# Patient Record
Sex: MALE | Race: WHITE | NOT HISPANIC OR LATINO | Employment: UNEMPLOYED | URBAN - METROPOLITAN AREA
[De-identification: names, ages, dates, MRNs, and addresses within clinical notes are randomized per-mention and may not be internally consistent; named-entity substitution may affect disease eponyms.]

---

## 2018-11-03 ENCOUNTER — HOSPITAL ENCOUNTER (EMERGENCY)
Facility: CLINIC | Age: 38
Discharge: HOME | End: 2018-11-03
Attending: EMERGENCY MEDICINE | Admitting: EMERGENCY MEDICINE

## 2018-11-03 VITALS
OXYGEN SATURATION: 99 % | RESPIRATION RATE: 18 BRPM | DIASTOLIC BLOOD PRESSURE: 76 MMHG | SYSTOLIC BLOOD PRESSURE: 108 MMHG | TEMPERATURE: 99.1 F

## 2018-11-03 DIAGNOSIS — F11.93 OPIOID WITHDRAWAL (H): ICD-10-CM

## 2018-11-03 PROCEDURE — 96360 HYDRATION IV INFUSION INIT: CPT

## 2018-11-03 PROCEDURE — 25000132 ZZH RX MED GY IP 250 OP 250 PS 637: Performed by: EMERGENCY MEDICINE

## 2018-11-03 PROCEDURE — 25000128 H RX IP 250 OP 636: Performed by: EMERGENCY MEDICINE

## 2018-11-03 PROCEDURE — 99283 EMERGENCY DEPT VISIT LOW MDM: CPT | Mod: 25

## 2018-11-03 PROCEDURE — 96361 HYDRATE IV INFUSION ADD-ON: CPT

## 2018-11-03 RX ORDER — CLONIDINE HYDROCHLORIDE 0.1 MG/1
0.2 TABLET ORAL ONCE
Status: COMPLETED | OUTPATIENT
Start: 2018-11-03 | End: 2018-11-03

## 2018-11-03 RX ORDER — PROMETHAZINE HYDROCHLORIDE 25 MG/ML
12.5 INJECTION, SOLUTION INTRAMUSCULAR; INTRAVENOUS ONCE
Status: DISCONTINUED | OUTPATIENT
Start: 2018-11-03 | End: 2018-11-03

## 2018-11-03 RX ORDER — DIPHENHYDRAMINE HYDROCHLORIDE 50 MG/ML
25 INJECTION INTRAMUSCULAR; INTRAVENOUS ONCE
Status: DISCONTINUED | OUTPATIENT
Start: 2018-11-03 | End: 2018-11-03 | Stop reason: HOSPADM

## 2018-11-03 RX ADMIN — CLONIDINE HYDROCHLORIDE 0.2 MG: 0.1 TABLET ORAL at 12:24

## 2018-11-03 RX ADMIN — SODIUM CHLORIDE 1000 ML: 9 INJECTION, SOLUTION INTRAVENOUS at 12:27

## 2018-11-03 ASSESSMENT — ENCOUNTER SYMPTOMS
ABDOMINAL PAIN: 1
DIARRHEA: 1
VOMITING: 1

## 2018-11-03 NOTE — ED TRIAGE NOTES
Pt arrives to ed with complaints of shaking, n/v, diarrhea for the last 3 days. Pt is withdrawing from heroin, LD 3.5 days ago. Pt states he is here with his father in the lobby in which he does not want him to know what is going on. He recently moved back to [place] to get clean. Pt has been using since he was 19 years old on and off.

## 2018-11-03 NOTE — ED AVS SNAPSHOT
[facility] Emergency Department    [address]    [facility] [address]    Phone:  [phone]    Fax:  [phone]                                       Ziggy Ochoa   MRN: [id number]    Department:  [facility] Emergency Department   Date of Visit:  11/3/2018           Patient Information     Date Of Birth          1980        Your diagnoses for this visit were:     Opioid withdrawal (H)        You were seen by Prasad Bunch MD and Ramon Perez MD.      Follow-up Information     Follow up with [facility] Emergency Department.    Specialty:  EMERGENCY MEDICINE    Why:  If symptoms worsen    Contact information:    [address]  [facility] [address] [phone]        Discharge Instructions         Opioid Withdrawal  Opioid withdrawal occurs in people who have used opioids on a daily basis for at least 3 weeks. Symptoms usually start about 12 hours after the last dose of the opioid. Withdrawal symptoms last from 3 to 5 days and may include yawning, sweating, runny nose, restlessness, stomach cramping, diarrhea, nausea, vomiting, hot and cold flashes, and trouble sleeping.  Home care  The treatment for withdrawal is mostly managing the symptoms without making the problem worse. Follow these guidelines when caring for yourself at home:    Stay with someone who can help you and give you emotional support during this time. Resist the temptation to take more of the addicting drug to stop your symptoms.    If you have stomach cramps, nausea, or vomiting, take only clear liquids until the symptoms improve. Adults should drink a total of 2 to 3 quarts of liquid daily. It is best to take small frequent drinks rather than a few large ones. You may consume liquids in any of the following forms: mineral water, apple juice, sports drinks, soft drinks without caffeine, clear broth soups, plain gelatin, and ice pops.    Don't use alcohol,  caffeine, or tobacco during this time.    Take any medicines prescribed for nausea or cramping exactly as directed.    If you were given a clonidine patch, leave this on for 7 days. You may remove it sooner if you develop excess dizziness or drowsiness.  Follow-up care  Follow up with your healthcare provider if you need further symptom control, or as advised. When you are through withdrawal, take the opportunity to enter a treatment program. This may help you stay off the addicting drug.  When to seek medical advice  Call your healthcare provider right away if any of these occur:    Fever of 100.4 F (38 C) or higher, or as directed by your healthcare provider    Inability to keep down liquids for 8 hours    Frequent diarrhea    Signs of infection at the site of IV needle use (redness, warmth, pain, or swelling)  Call 911  Call 911 if any of these occur:    Trouble breathing or swallowing, or wheezing    Severe confusion    Extreme drowsiness or trouble awakening    Fainting or loss of consciousness    Rapid heart rate or very slow heart rate    Very low or very high blood pressure    Vomiting blood, or large amounts of blood in stool    Seizure  Date Last Reviewed: 3/1/2017    1620-8074 [employer]. [address] [place] [address]. All rights reserved. This information is not intended as a substitute for professional medical care. Always follow your healthcare professional's instructions.          24 Hour Appointment Hotline       To make an appointment at any [facility], call [phone] ([phone]). If you don't have a family doctor or clinic, we will help you find one. [facility] are conveniently located to serve the needs of you and your family.             Review of your medicines      Our records show that you are taking the medicines listed below. If these are incorrect, please call your family doctor or clinic.        Dose / Directions Last dose taken    CLONAZEPAM  PO        Refills:  0                Procedures and tests performed during your visit     Basic metabolic panel (BMP)    CBC + differential    IV access      Orders Needing Specimen Collection     None      Pending Results     Date and Time Order Name Status Description    11/3/2018 1208 Basic metabolic panel (BMP) In process     11/3/2018 1208 CBC + differential In process             Pending Culture Results     No orders found from 11/1/2018 to 11/4/2018.            Pending Results Instructions     If you had any lab results that were not finalized at the time of your Discharge, you can call the ED Lab Result RN at [phone]. You will be contacted by this team for any positive Lab results or changes in treatment. The nurses are available 7 days a week from 10A to 6:30P.  You can leave a message 24 hours per day and they will return your call.        Test Results From Your Hospital Stay        11/3/2018 12:30 PM         11/3/2018 12:30 PM                Clinical Quality Measure: Blood Pressure Screening     Your blood pressure was checked while you were in the emergency department today. The last reading we obtained was  BP: 108/76 . Please read the guidelines below about what these numbers mean and what you should do about them.  If your systolic blood pressure (the top number) is less than 120 and your diastolic blood pressure (the bottom number) is less than 80, then your blood pressure is normal. There is nothing more that you need to do about it.  If your systolic blood pressure (the top number) is 120-139 or your diastolic blood pressure (the bottom number) is 80-89, your blood pressure may be higher than it should be. You should have your blood pressure rechecked within a year by a primary care provider.  If your systolic blood pressure (the top number) is 140 or greater or your diastolic blood pressure (the bottom number) is 90 or greater, you may have high blood pressure. High blood pressure is  "treatable, but if left untreated over time it can put you at risk for heart attack, stroke, or kidney failure. You should have your blood pressure rechecked by a primary care provider within the next 4 weeks.  If your provider in the emergency department today gave you specific instructions to follow-up with your doctor or provider even sooner than that, you should follow that instruction and not wait for up to 4 weeks for your follow-up visit.        Thank you for choosing [facility]       Thank you for choosing [facility] for your care. Our goal is always to provide you with excellent care. Hearing back from our patients is one way we can continue to improve our services. Please take a few minutes to complete the written survey that you may receive in the mail after you visit with us. Thank you!        [employer] Information     [employer] lets you send messages to your doctor, view your test results, renew your prescriptions, schedule appointments and more. To sign up, go to [email or web address] . Click on \"Log in\" on the left side of the screen, which will take you to the Welcome page. Then click on \"Sign up Now\" on the right side of the page.     You will be asked to enter the access code listed below, as well as some personal information. Please follow the directions to create your username and password.     Your access code is: [email or web address]  Expires: 2019  2:07 PM     Your access code will  in 90 days. If you need help or a new code, please call your [facility] clinic or [phone].        Care EveryWhere ID     This is your Care EveryWhere ID. This could be used by other organizations to access your [facility] medical records  [id number]        Equal Access to Services     [facility] BRIDGET : Bienvenido Ramirez, jacklyn garcia, naif reed. So [facility] [phone].    ATENCIÓN: Si habla español, tiene a ahn disposición servicios gratuitos de " asistencia lingüística. Alec al [phone].    We comply with applicable federal civil rights laws and [place] laws. We do not discriminate on the basis of race, color, national origin, age, disability, sex, sexual orientation, or gender identity.            After Visit Summary       This is your record. Keep this with you and show to your community pharmacist(s) and doctor(s) at your next visit.

## 2018-11-03 NOTE — ED NOTES
Patient came up to me at my computer clothed and stated he wanted to leave, everything we are doing for him hear he can do at home. I stated it would be best if he stayed so we can confirm nothing is wrong and help him get set up with proper treatment if possible, patient stated he did not want that and left.

## 2018-11-03 NOTE — DISCHARGE INSTRUCTIONS
Opioid Withdrawal  Opioid withdrawal occurs in people who have used opioids on a daily basis for at least 3 weeks. Symptoms usually start about 12 hours after the last dose of the opioid. Withdrawal symptoms last from 3 to 5 days and may include yawning, sweating, runny nose, restlessness, stomach cramping, diarrhea, nausea, vomiting, hot and cold flashes, and trouble sleeping.  Home care  The treatment for withdrawal is mostly managing the symptoms without making the problem worse. Follow these guidelines when caring for yourself at home:    Stay with someone who can help you and give you emotional support during this time. Resist the temptation to take more of the addicting drug to stop your symptoms.    If you have stomach cramps, nausea, or vomiting, take only clear liquids until the symptoms improve. Adults should drink a total of 2 to 3 quarts of liquid daily. It is best to take small frequent drinks rather than a few large ones. You may consume liquids in any of the following forms: mineral water, apple juice, sports drinks, soft drinks without caffeine, clear broth soups, plain gelatin, and ice pops.    Don't use alcohol, caffeine, or tobacco during this time.    Take any medicines prescribed for nausea or cramping exactly as directed.    If you were given a clonidine patch, leave this on for 7 days. You may remove it sooner if you develop excess dizziness or drowsiness.  Follow-up care  Follow up with your healthcare provider if you need further symptom control, or as advised. When you are through withdrawal, take the opportunity to enter a treatment program. This may help you stay off the addicting drug.  When to seek medical advice  Call your healthcare provider right away if any of these occur:    Fever of 100.4 F (38 C) or higher, or as directed by your healthcare provider    Inability to keep down liquids for 8 hours    Frequent diarrhea    Signs of infection at the site of IV needle use (redness,  warmth, pain, or swelling)  Call 911  Call 911 if any of these occur:    Trouble breathing or swallowing, or wheezing    Severe confusion    Extreme drowsiness or trouble awakening    Fainting or loss of consciousness    Rapid heart rate or very slow heart rate    Very low or very high blood pressure    Vomiting blood, or large amounts of blood in stool    Seizure  Date Last Reviewed: 3/1/2017    7919-5866 [employer]. [address] [place] [address], [place] [address]. All rights reserved. This information is not intended as a substitute for professional medical care. Always follow your healthcare professional's instructions.

## 2018-11-03 NOTE — ED PROVIDER NOTES
History     Chief Complaint:  Withdrawal    HPI   Ziggy Ochoa is a 38 year old male with a history of heroin abuse, who presents to the ED for the evaluation of withdrawal. The patient reports that one week ago he moved from [place], [place] to [place], [place] in order to get clean from heroin. The patient notes that the last time he used heroin was three days ago and that he is starting to experience diarrhea, cramping in his abdomen, vomiting, and shaking, prompting him to the ED. The patient notes that he made the decision to move and get clean from heroin by himself and that his family does not know he has been using. The patient denies use of alcohol or other drugs.    Allergies:  No known drug allergies     Medications:    The patient is not currently taking any prescribed medications.    Past Medical History:    Heroin Abuse    Past Surgical History:    History reviewed. No pertinent surgical history.    Family History:    History reviewed. No pertinent family history.     Social History:  Smoking status: Never Smoker  Alcohol use: No  Marital Status:  Single     Review of Systems   Gastrointestinal: Positive for abdominal pain, diarrhea and vomiting.   All other systems reviewed and are negative.    Physical Exam     Patient Vitals for the past 24 hrs:   BP Temp Temp src Heart Rate Resp SpO2   11/03/18 1224 108/76 - - - - -   11/03/18 1212 118/86 99.1  F (37.3  C) Oral 102 18 99 %       Physical Exam  Vital signs and nursing notes reviewed.     Constitutional: laying on gurney appear mildly uncomfortable  HENT: Oropharynx is clear and moist  Eyes: Conjunctivae are normal bilaterally. Pupils equal mildly dilated  Neck: normal range of motion  Cardiovascular: mild rate, regular rhythm, normal heart sounds.   Pulmonary/Chest: Effort normal and breath sounds normal. No respiratory distress.   Abdominal: Soft. Bowel sounds are normal. No tenderness to palpation. No rebound or guarding.   Musculoskeletal:  No joint swelling or edema.   Neurological: Alert and oriented. No focal weakness, moves all extremities equally.  Skin: Skin is warm and dry. No rash noted.   Psych: normal affect, no agitation, hallucinations    Emergency Department Course   Interventions:  1224, catapres, 0.2 mg, PO  1227, NS 1L IV Bolus    Emergency Department Course:  Past medical records, nursing notes, and vitals reviewed.  1204: I performed an exam of the patient and obtained history, as documented above.    IV inserted and blood drawn.    Patient Eloped    Impression & Plan    Medical Decision Making:  Patient is a 38 year old male who presents with symptoms suggesting of opioid withdrawal. Patient admits that he used to use heroin fairly significantly. He recently moved back to this area to get away from the heroin scene in [place]. He states that he last used about three days ago. Patient reports symptoms that would be consistent with opioid withdrawal. We placed an IV, and patient was given IV fluids, anti nausea medications and catapres, to hopefully help with his withdrawal symptoms. Patient is  alert and orientated. He has no evidence of delirium or other concerning findings. His vital signs show no significant abnormality. Patient then later asked the nurse if he was going to get some benzodiazapine's and he reported to the patient that not at this point and the patient said then he was just going to leave and did not want to talk to the physician. He said he can take the same medications that we gave him here at home. Patient had eloped before I was able to discuss with the patient his plan for further treatment.     Diagnosis:    ICD-10-CM    1. Opioid withdrawal (H) F11.23             Disposition:  Patient eloped.     Collins Duke  11/3/2018   [facility] EMERGENCY DEPARTMENT  Scribe Disclosure:  I, Collins Duke, am serving as a scribe at 12:04 PM on 11/3/2018 to document services personally performed by  Ramno Perez MD based on my observations and the provider's statements to me.      Ramon Perez MD  11/03/18 6992

## 2018-11-03 NOTE — ED AVS SNAPSHOT
[facility] Emergency Department    [address]    [facility] [address]    Phone:  [phone]    Fax:  [phone]                                       Ziggy Ochoa   MRN: [id number]    Department:  [facility] Emergency Department   Date of Visit:  11/3/2018           After Visit Summary Signature Page     I have received my discharge instructions, and my questions have been answered. I have discussed any challenges I see with this plan with the nurse or doctor.    ..........................................................................................................................................  Patient/Patient Representative Signature      ..........................................................................................................................................  Patient Representative Print Name and Relationship to Patient    ..................................................               ................................................  Date                                   Time    ..........................................................................................................................................  Reviewed by Signature/Title    ...................................................              ..............................................  Date                                               Time          22EPIC Rev 08/18

## 2021-06-03 ENCOUNTER — TELEPHONE (OUTPATIENT)
Dept: FAMILY MEDICINE | Facility: CLINIC | Age: 41
End: 2021-06-03

## 2021-06-03 ENCOUNTER — OFFICE VISIT (OUTPATIENT)
Dept: FAMILY MEDICINE | Facility: CLINIC | Age: 41
End: 2021-06-03
Payer: COMMERCIAL

## 2021-06-03 VITALS
HEART RATE: 86 BPM | HEIGHT: 75 IN | WEIGHT: 200 LBS | DIASTOLIC BLOOD PRESSURE: 84 MMHG | BODY MASS INDEX: 24.87 KG/M2 | OXYGEN SATURATION: 96 % | RESPIRATION RATE: 16 BRPM | TEMPERATURE: 98.3 F | SYSTOLIC BLOOD PRESSURE: 122 MMHG

## 2021-06-03 DIAGNOSIS — N63.0 LUMP OR MASS IN BREAST: Primary | ICD-10-CM

## 2021-06-03 DIAGNOSIS — L74.519 FOCAL HYPERHIDROSIS: Primary | ICD-10-CM

## 2021-06-03 DIAGNOSIS — R61 GENERALIZED HYPERHIDROSIS: ICD-10-CM

## 2021-06-03 PROCEDURE — 99204 OFFICE O/P NEW MOD 45 MIN: CPT | Performed by: NURSE PRACTITIONER

## 2021-06-03 RX ORDER — METHADONE HYDROCHLORIDE 10 MG/ML
90 CONCENTRATE ORAL EVERY 8 HOURS
COMMUNITY
End: 2023-02-17

## 2021-06-03 SDOH — HEALTH STABILITY: MENTAL HEALTH: HOW OFTEN DO YOU HAVE 6 OR MORE DRINKS ON ONE OCCASION?: NOT ASKED

## 2021-06-03 SDOH — HEALTH STABILITY: MENTAL HEALTH: HOW MANY STANDARD DRINKS CONTAINING ALCOHOL DO YOU HAVE ON A TYPICAL DAY?: NOT ASKED

## 2021-06-03 SDOH — HEALTH STABILITY: MENTAL HEALTH: HOW OFTEN DO YOU HAVE A DRINK CONTAINING ALCOHOL?: NOT ASKED

## 2021-06-03 ASSESSMENT — ANXIETY QUESTIONNAIRES
7. FEELING AFRAID AS IF SOMETHING AWFUL MIGHT HAPPEN: NOT AT ALL
6. BECOMING EASILY ANNOYED OR IRRITABLE: SEVERAL DAYS
2. NOT BEING ABLE TO STOP OR CONTROL WORRYING: MORE THAN HALF THE DAYS
1. FEELING NERVOUS, ANXIOUS, OR ON EDGE: MORE THAN HALF THE DAYS
5. BEING SO RESTLESS THAT IT IS HARD TO SIT STILL: SEVERAL DAYS
GAD7 TOTAL SCORE: 10
IF YOU CHECKED OFF ANY PROBLEMS ON THIS QUESTIONNAIRE, HOW DIFFICULT HAVE THESE PROBLEMS MADE IT FOR YOU TO DO YOUR WORK, TAKE CARE OF THINGS AT HOME, OR GET ALONG WITH OTHER PEOPLE: VERY DIFFICULT
3. WORRYING TOO MUCH ABOUT DIFFERENT THINGS: MORE THAN HALF THE DAYS

## 2021-06-03 ASSESSMENT — PATIENT HEALTH QUESTIONNAIRE - PHQ9
SUM OF ALL RESPONSES TO PHQ QUESTIONS 1-9: 5
5. POOR APPETITE OR OVEREATING: MORE THAN HALF THE DAYS

## 2021-06-03 ASSESSMENT — MIFFLIN-ST. JEOR: SCORE: 1897.82

## 2021-06-03 ASSESSMENT — PAIN SCALES - GENERAL: PAINLEVEL: NO PAIN (0)

## 2021-06-03 NOTE — PATIENT INSTRUCTIONS
Patient Education     Understanding Hyperhidrosis   Hyperhidrosis is excessive sweating. It s often an ongoing (chronic) condition. Sweating is a normal process. It helps manage body temperature and other processes of the body. But excessive sweating is more than is needed to do this. The symptoms can start when you re a child and continue into adulthood.    How to say it  [id number]-hi-DROH-[id number]  What causes hyperhidrosis?  In most cases, the cause isn t known. It may be because of a problem with how the nervous system responds to stress. In some cases, it may be caused by another health condition or a medicine. This is known as secondary hyperhidrosis.   Symptoms of hyperhidrosis  The main symptom of hyperhidrosis is heavy sweating that:    Can cause problems with daily activities and social events    Happens during the day but not at night    May happen with no physical activity  The sweating occurs most often in any or all of these areas:    Bottoms of the feet    Palms    Underarms  In some cases, it may also occur in these areas:    Face    Groin    Scalp    Under the breasts  Treatment for hyperhidrosis  Treatment choices include:    Antiperspirant. An antiperspirant that has 10% to 15% aluminum chloride can block sweat glands and help stop sweating. It comes in creams, sticks, gels, and sprays. You can buy antiperspirant at a drugstore. Or your healthcare provider may give you a stronger prescription antiperspirant that has 20% aluminum chloride. Antiperspirant should be applied to dry skin at night before bed. It needs to be applied every night for a week or two, and then once or twice a week or as needed. This can irritate the skin for some people.    Botulinum toxin. This is a type of medicine that is injected into the areas with sweat glands. This medicine temporarily blocks a chemical that stimulates the sweat glands. You ll need to get injections every 4 to 6 months.    Iontophoresis. This treatment uses  electricity to block sweat glands. Moist pads are put on the skin, or your hands or feet are placed in shallow water. Chemicals may be added to the water. An electrical current is sent through fluid. The process is done several times a week until sweating is reduced, and then once a week or as advised.    Surgery. In severe cases, you may have surgery to remove your sweat glands. Or surgery can be done to cut the nerves that send signals to the sweat glands. Either of these types of surgery can stop sweat permanently.  But this can lead to compensatory hyperhidrosis. This means you will start sweating from another part of your body.    Electromagnetic energy device. This device uses electromagnetic energy to destroy the sweat glands in the underarm area.    Treating another health condition, or changing a medicine.  A health condition or a medicine can cause secondary hyperhidrosis. This can be managed by treating the health condition, or by a change in medicine. Your healthcare provider will talk with you about these options if you have secondary hyperhidrosis.    Oral medicines. There are medicines that can be taken by mouth that will help reduce sweating.  Possible complications of hyperhidrosis  You may have skin problems in the areas where you sweat. The skin may become moist, pale, swollen, and soft enough to rub away easily. This is known as skin maceration. It can lead to loss of skin, pain, and skin infection. You can help prevent this problem by treating your hyperhidrosis and keeping your skin dry as much as possible.   Living with hyperhidrosis  Hyperhidrosis may be caused by or made worse by emotional stress and heat. It can also cause problems with work and social life. You may have stains on your clothes, and not want to shake hands with people. It can be upsetting to cope with the problems of excess sweat. Talk with your healthcare provider about the following:     Support groups    How to prevent  skin maceration    Other ways to manage your condition long-term  When to call your healthcare provider  Call your healthcare provider right away if you have any of these:    Symptoms that don t get better, or get worse    New symptoms  Juno last reviewed this educational content on 5/1/2020 2000-2021 [employer]. All rights reserved. This information is not intended as a substitute for professional medical care. Always follow your healthcare professional's instructions.

## 2021-06-03 NOTE — TELEPHONE ENCOUNTER
[facility] Prior Authorization Team Request    Medication: Drysol 20% SOLN  Dosing:   NDC (required for Medicaid members):     Insurance   BIN: [id number]  PCN: [place]  Grp: [id number]  ID: [id number]    CoverMyMeds Key (if applicable):     Additional documentation:     Filling Pharmacy: [facility]  Phone Number: [phone]  Contact: [facility] (P [id number]) please send all responses to this pool.  Pharmacy NPI (required for Medicaid members):

## 2021-06-03 NOTE — PROGRESS NOTES
"Assessment & Plan     Lump or mass in breast  - US Breast Left Complete 4 Quadrants  - Mammogram, diagnostic    Generalized hyperhidrosis  - aluminum chloride (DRYSOL) 20 % external solution  Dispense: 60 mL; Refill: 0  Topical: Apply once daily at bedtime; once excessive sweating has stopped, may decrease to once or twice weekly, or as needed. Wash treated area in the morning. Discussed with patient that prescription strength antiperspirants should be applied nightly to the area of hyperhidrosis until improvement is noted          27 minutes spent on the date of the encounter doing chart review, history and exam, documentation and further activities per the note  {       BMI:   Estimated body mass index is 25 kg/m  as calculated from the following:    Height as of this encounter: 1.905 m (6' 3\").    Weight as of this encounter: 90.7 kg (200 lb).       See Patient Instructions  Breast ultrasound and diagnostic mammogram.    Trial of aluminum chloride (DRYSOL) 20 % external solution    Return to clinic if no improvement or symptoms worsen.   Patient verbalized understanding & agreed with plan of care.    CHOLO Maynard, CNP  M [facility] [place]      Marysol Trinh is a 40 year old who presents for the following health issues  accompanied by himself:    HPI   Patient noticed a lump left areola x 2 to 3 weeks ago.  Not painful but maybe a little sore.  No redness.  States the lump has stayed the same.   Declines fevers, chills, or body aches.  No night sweats.   Has daily daily sweats though secondary to being on methadone.  Past heroine use but has been sober for 2 years.  Is being treated at the [facility] in [place] x 2 years.  Patient states he has been on methadone for 2 years but has trying to get off methadone.  States he is working with [facility] in [place] who is assisting him with tapering off the methadone. Is not on any other " "medications.  Was being seen by a psychiatrist. Tried gabapentin, zoloft - neither of the medication worked.   Diagnosed with anxiety and PTSD.    Feels like anxiety is doing okay.  Got a dog about 2 months ago which has made a huge different in his mood - mini andreea dumont.  Has been participating in exercise via walking with the dog.  History of heroin use - sober for 2 years.   Has lived all over the place - worked in tourist attractions and worked as a .  Currently on unemployment, looking for a job.  Marijuana use daily for anxiety.  No family history of breast cancer.    He does have excessive sweating due to methadone.  Declines concerns with excessive sweating when he isn't on methadone.   Would like treated for excessive sweating.  States he has had blood work in the past for HIV, hepatitis B and hepatitis C - no concerns with these today.   He states he is not currently sexually active.            Review of Systems   Constitutional, HEENT, cardiovascular, pulmonary, gi and gu systems are negative, except as otherwise noted.      Objective    /84 (BP Location: Right arm, Patient Position: Sitting, Cuff Size: Adult Regular)   Pulse 86   Temp 98.3  F (36.8  C) (Oral)   Resp 16   Ht 1.905 m (6' 3\")   Wt 90.7 kg (200 lb)   SpO2 96%   BMI 25.00 kg/m    Body mass index is 25 kg/m .  Physical Exam   GENERAL: healthy, alert and no distress  RESP: lungs clear to auscultation - no rales, rhonchi or wheezes  BREAST: right breast - normal without masses, tenderness or nipple discharge and no palpable axillary masses or adenopathy.  Left breast - small non-mobile lump noted under left nipple, no tenderness noted or nipple discharge.  No palpable axillary masses or adenopathy on the left side.  CV: regular rate and rhythm, normal S1 S2, no S3 or S4, no murmur, click or rub, no peripheral edema and peripheral pulses strong  SKIN: no suspicious lesions or rashes  NEURO: Normal strength and tone, " mentation intact and speech normal  PSYCH: mentation appears normal, affect normal/bright    Left breast ultrasound and diagnostic mammogram ordered.

## 2021-06-03 NOTE — NURSING NOTE
Chief Complaint   Patient presents with     Mass     Patient complains of a lump on the left side of his chest.          Pipo Mcwilliams MA on 6/3/2021 at 12:41 PM

## 2021-06-04 ASSESSMENT — ANXIETY QUESTIONNAIRES: GAD7 TOTAL SCORE: 10

## 2021-06-04 NOTE — TELEPHONE ENCOUNTER
PRIOR AUTHORIZATION DENIED    Medication: aluminum chloride (DRYSOL) 20 % external solution--DENIED    Denial Date: 6/4/2021    Denial Rational: per [facility] rep, there is no NDC rebate agreement with .  Patient would need to call [facility] to attempt a member level appeal.

## 2021-06-17 ENCOUNTER — ANCILLARY PROCEDURE (OUTPATIENT)
Dept: MAMMOGRAPHY | Facility: CLINIC | Age: 41
End: 2021-06-17
Attending: NURSE PRACTITIONER
Payer: COMMERCIAL

## 2021-06-17 DIAGNOSIS — N63.0 LUMP OR MASS IN BREAST: ICD-10-CM

## 2021-06-17 PROCEDURE — 77066 DX MAMMO INCL CAD BI: CPT

## 2021-07-11 ENCOUNTER — TELEPHONE (OUTPATIENT)
Dept: FAMILY MEDICINE | Facility: CLINIC | Age: 41
End: 2021-07-11

## 2021-08-31 NOTE — TELEPHONE ENCOUNTER
Patient is calling the clinic stating that he received a message from the provider about an alternative to the Drysol medication (Oxybutynin). Patient thought provider would be sending this to the pharmacy but it was never received.     Can provider send this medication to the patient's preferred pharmacy? Otherwise, if there is an issue, please reach out to the patient.     Ananth   [address] [place], [place] [id number] [phone]

## 2021-09-07 RX ORDER — OXYBUTYNIN CHLORIDE 5 MG/1
5 TABLET, EXTENDED RELEASE ORAL DAILY
Qty: 30 TABLET | Refills: 0 | Status: SHIPPED | OUTPATIENT
Start: 2021-09-07 | End: 2021-10-06

## 2021-09-07 NOTE — TELEPHONE ENCOUNTER
Primary focal hyperhidrosis (alternative agent) (off-label use): Oral:  Extended release: 5 to 10 mg once daily ([place] 2020)    Prescription sent to pharmacy.      Antoinette

## 2021-10-05 DIAGNOSIS — L74.519 FOCAL HYPERHIDROSIS: ICD-10-CM

## 2021-10-06 NOTE — TELEPHONE ENCOUNTER
oxybutynin ER (DITROPAN-XL) 5 MG 24 hr tablet  Take 1 tablet (5 mg) by mouth daily       Last Written Prescription Date:  9/7/21  Last Fill Quantity: 30,   # refills: 0  Last Office Visit : 6/3/21  Future Office visit:  none    Routing refill request to provider for review/approval because:  Med not on refill protocol for associated diagnosis.

## 2021-10-12 RX ORDER — OXYBUTYNIN CHLORIDE 5 MG/1
5 TABLET, EXTENDED RELEASE ORAL DAILY
Qty: 30 TABLET | Refills: 0 | Status: SHIPPED | OUTPATIENT
Start: 2021-10-12 | End: 2021-11-17

## 2021-11-17 DIAGNOSIS — L74.519 FOCAL HYPERHIDROSIS: ICD-10-CM

## 2021-11-17 NOTE — TELEPHONE ENCOUNTER
OXYBUTYNIN ER 5MG TABLETS      Last Written Prescription Date:  10/12/21  Last Fill Quantity: 30,   # refills: 0  Last Office Visit : 6/3/21  Future Office visit:  None scheduled    Routing refill request to provider for review/approval because:  Last prescribed for limited quantity without refills

## 2021-11-23 ENCOUNTER — TELEPHONE (OUTPATIENT)
Dept: FAMILY MEDICINE | Facility: CLINIC | Age: 41
End: 2021-11-23
Payer: COMMERCIAL

## 2021-11-23 RX ORDER — OXYBUTYNIN CHLORIDE 5 MG/1
5 TABLET, EXTENDED RELEASE ORAL DAILY
Qty: 30 TABLET | Refills: 0 | Status: SHIPPED | OUTPATIENT
Start: 2021-11-23 | End: 2022-07-26

## 2021-11-23 NOTE — TELEPHONE ENCOUNTER
Called patient to let him know that medication is at the pharmacy and to keep the appt for 12/7/21.    Ashley Singer CMA

## 2021-11-23 NOTE — TELEPHONE ENCOUNTER
I would like to see patient back in clinic for a medication check.  Please call patient to help schedule this.      Thank you,  Antoinette

## 2021-11-23 NOTE — TELEPHONE ENCOUNTER
Patient scheduled for next available day, 12/7/21. He is wondering if he can get his medication refilled before that date because he will be out.     Ashley Singer, CMA

## 2021-12-24 DIAGNOSIS — L74.519 FOCAL HYPERHIDROSIS: ICD-10-CM

## 2021-12-28 RX ORDER — OXYBUTYNIN CHLORIDE 5 MG/1
5 TABLET, EXTENDED RELEASE ORAL DAILY
Qty: 30 TABLET | Refills: 0 | OUTPATIENT
Start: 2021-12-28

## 2021-12-28 NOTE — TELEPHONE ENCOUNTER
OXYBUTYNIN ER 5MG TABLETS      Last Written Prescription Date:  11/23/21  Last Fill Quantity: 30,   # refills: 0  Last Office Visit : 6/3/21  Future Office visit:  None scheduled     Routing refill request to provider for review/approval because:  No showed 12/7/21  Last filled for #30- no refills..

## 2022-01-04 ENCOUNTER — OFFICE VISIT (OUTPATIENT)
Dept: FAMILY MEDICINE | Facility: CLINIC | Age: 42
End: 2022-01-04
Payer: COMMERCIAL

## 2022-01-04 VITALS
SYSTOLIC BLOOD PRESSURE: 118 MMHG | HEART RATE: 100 BPM | DIASTOLIC BLOOD PRESSURE: 78 MMHG | HEIGHT: 75 IN | BODY MASS INDEX: 25.86 KG/M2 | OXYGEN SATURATION: 98 % | WEIGHT: 208 LBS

## 2022-01-04 DIAGNOSIS — R61 GENERALIZED HYPERHIDROSIS: ICD-10-CM

## 2022-01-04 DIAGNOSIS — L74.519 FOCAL HYPERHIDROSIS: Primary | ICD-10-CM

## 2022-01-04 PROCEDURE — 99213 OFFICE O/P EST LOW 20 MIN: CPT | Performed by: NURSE PRACTITIONER

## 2022-01-04 RX ORDER — OXYBUTYNIN CHLORIDE 10 MG/1
10 TABLET, EXTENDED RELEASE ORAL DAILY
Qty: 90 TABLET | Refills: 1 | Status: SHIPPED | OUTPATIENT
Start: 2022-01-04 | End: 2022-07-26

## 2022-01-04 RX ORDER — LORAZEPAM 0.5 MG/1
0.5 TABLET ORAL 2 TIMES DAILY PRN
COMMUNITY
Start: 2021-12-08 | End: 2024-04-18

## 2022-01-04 ASSESSMENT — MIFFLIN-ST. JEOR: SCORE: 1929.11

## 2022-01-04 ASSESSMENT — PAIN SCALES - GENERAL: PAINLEVEL: MILD PAIN (3)

## 2022-01-04 NOTE — PATIENT INSTRUCTIONS

## 2022-01-04 NOTE — NURSING NOTE
Chief Complaint   Patient presents with     Recheck Medication     med check, follow up per pt        Xiao Zuñiga EMT at 1:16 PM on 1/4/2022.

## 2022-01-04 NOTE — PROGRESS NOTES
"  Assessment & Plan   Generalized hyperhidrosis  - oxybutynin ER (DITROPAN-XL) 10 MG 24 hr tablet  Dispense: 90 tablet; Refill: 1    10 minutes spent on the date of the encounter doing chart review, history and exam, documentation and further activities per the note  56}     BMI:   Estimated body mass index is 26 kg/m  as calculated from the following:    Height as of this encounter: 1.905 m (6' 3\").    Weight as of this encounter: 94.3 kg (208 lb).       See Patient Instructions  Follow-up August 2022, sooner if needed  Return to clinic if no improvement or symptoms worsen.  Patient verbalized understanding & agreed with plan of care.    CHOLO Maynard, CNP  M [facility] [place] Trinh is a 41 year old who presents for the following health issues  accompanied by himself.    HPI   Patient last seen June 2021.  He was seen for a left breast mass and concerns with excessive seating. He is on methadone. Past history of heroine use but remains soober for over 2 years. He had bilateral diagnostic mammogram which showed mild left-sided gynecomastia.  No suspicious finding were identified.  Methadone can have a side effect of gynecomastia.  He is trying to get off methadone.   Does not want and further work-up unless gynecomastia persists after he is zeinab to stop the methadone.  He was started on oxybutynin 5mg which is feels is helpful for generalized hyperhidrosis.   Drysol lwas not covered by his insurance.  He declines any further questions ro concerns.    Review of Systems   Constitutional, HEENT, cardiovascular, pulmonary, gi and gu systems are negative, except as otherwise noted.      Objective    /78 (BP Location: Right arm, Patient Position: Sitting, Cuff Size: Adult Regular)   Pulse 100   Ht 1.905 m (6' 3\")   Wt 94.3 kg (208 lb)   SpO2 98%   BMI 26.00 kg/m    Body mass index is 26 kg/m .  Physical Exam   GENERAL: healthy, alert and no " distress  CV: regular rate and rhythm, normal S1 S2, no S3 or S4, no murmur, click or rub, no peripheral edema and peripheral pulses strong  ABDOMEN: soft, nontender, no hepatosplenomegaly, no masses and bowel sounds normal  MS: no gross musculoskeletal defects noted, no edema  PSYCH: mentation appears normal, affect normal/bright    No results found for any previous visit.

## 2022-07-22 DIAGNOSIS — R61 GENERALIZED HYPERHIDROSIS: ICD-10-CM

## 2022-07-25 NOTE — TELEPHONE ENCOUNTER
oxybutynin ER (DITROPAN-XL) 10 MG 24 hr tablet      Last Written Prescription Date:  1/4/2022  Last Fill Quantity: 90,   # refills: 1  Last Office Visit : 1/4/2022  Future Office visit:  7/26/2022    Routing refill request to provider for review/approval because:  Refill needs review- continue at current dose?  - last visit 1/4/2022 dose increased to 10mg from 5mg   - future visit tomorrow 7/26/2022

## 2022-07-26 ENCOUNTER — OFFICE VISIT (OUTPATIENT)
Dept: FAMILY MEDICINE | Facility: CLINIC | Age: 42
End: 2022-07-26
Payer: COMMERCIAL

## 2022-07-26 VITALS
OXYGEN SATURATION: 99 % | RESPIRATION RATE: 17 BRPM | TEMPERATURE: 98.9 F | WEIGHT: 199.2 LBS | HEART RATE: 75 BPM | SYSTOLIC BLOOD PRESSURE: 124 MMHG | DIASTOLIC BLOOD PRESSURE: 86 MMHG | BODY MASS INDEX: 24.9 KG/M2

## 2022-07-26 DIAGNOSIS — G44.209 ACUTE NON INTRACTABLE TENSION-TYPE HEADACHE: Primary | ICD-10-CM

## 2022-07-26 DIAGNOSIS — K21.00 GASTROESOPHAGEAL REFLUX DISEASE WITH ESOPHAGITIS, UNSPECIFIED WHETHER HEMORRHAGE: ICD-10-CM

## 2022-07-26 DIAGNOSIS — R61 GENERALIZED HYPERHIDROSIS: ICD-10-CM

## 2022-07-26 PROCEDURE — 99213 OFFICE O/P EST LOW 20 MIN: CPT | Performed by: NURSE PRACTITIONER

## 2022-07-26 RX ORDER — OMEPRAZOLE 40 MG/1
40 CAPSULE, DELAYED RELEASE ORAL DAILY
Qty: 30 CAPSULE | Refills: 0 | Status: SHIPPED | OUTPATIENT
Start: 2022-07-26 | End: 2022-08-30

## 2022-07-26 RX ORDER — OXYBUTYNIN CHLORIDE 10 MG/1
10 TABLET, EXTENDED RELEASE ORAL DAILY
Qty: 90 TABLET | Refills: 1 | Status: SHIPPED | OUTPATIENT
Start: 2022-07-26 | End: 2023-02-17

## 2022-07-26 RX ORDER — METOCLOPRAMIDE 10 MG/1
10 TABLET ORAL
Qty: 10 TABLET | Refills: 0 | Status: SHIPPED | OUTPATIENT
Start: 2022-07-26 | End: 2024-04-18

## 2022-07-26 RX ORDER — OXYBUTYNIN CHLORIDE 10 MG/1
10 TABLET, EXTENDED RELEASE ORAL DAILY
Qty: 90 TABLET | Refills: 3 | OUTPATIENT
Start: 2022-07-26

## 2022-07-26 RX ORDER — SUMATRIPTAN 50 MG/1
50 TABLET, FILM COATED ORAL
Qty: 10 TABLET | Refills: 0 | Status: SHIPPED | OUTPATIENT
Start: 2022-07-26 | End: 2023-02-17

## 2022-07-26 ASSESSMENT — PATIENT HEALTH QUESTIONNAIRE - PHQ9
5. POOR APPETITE OR OVEREATING: MORE THAN HALF THE DAYS
SUM OF ALL RESPONSES TO PHQ QUESTIONS 1-9: 4

## 2022-07-26 ASSESSMENT — ANXIETY QUESTIONNAIRES
IF YOU CHECKED OFF ANY PROBLEMS ON THIS QUESTIONNAIRE, HOW DIFFICULT HAVE THESE PROBLEMS MADE IT FOR YOU TO DO YOUR WORK, TAKE CARE OF THINGS AT HOME, OR GET ALONG WITH OTHER PEOPLE: SOMEWHAT DIFFICULT
3. WORRYING TOO MUCH ABOUT DIFFERENT THINGS: NEARLY EVERY DAY
5. BEING SO RESTLESS THAT IT IS HARD TO SIT STILL: SEVERAL DAYS
1. FEELING NERVOUS, ANXIOUS, OR ON EDGE: NEARLY EVERY DAY
GAD7 TOTAL SCORE: 13
GAD7 TOTAL SCORE: 13
2. NOT BEING ABLE TO STOP OR CONTROL WORRYING: NEARLY EVERY DAY
7. FEELING AFRAID AS IF SOMETHING AWFUL MIGHT HAPPEN: SEVERAL DAYS
6. BECOMING EASILY ANNOYED OR IRRITABLE: NOT AT ALL

## 2022-07-26 NOTE — NURSING NOTE
Chief Complaint   Patient presents with     Abdominal Pain     Have been having it for six months to a year  cramping     Headache     Been getting worse, can no longer take anything because of his stomach, has had migraines

## 2022-07-26 NOTE — PROGRESS NOTES
"  Assessment & Plan     Generalized hyperhidrosis  oxybutynin ER (DITROPAN XL) 10 MG 24 hr tablet  Dispense: 90 tablet; Refill: 1    Acute non intractable tension-type headache  - SUMAtriptan (IMITREX) 50 MG tablet  Dispense: 10 tablet; Refill: 0  - metoclopramide (REGLAN) 10 MG tablet  Dispense: 10 tablet; Refill: 0    Gastroesophageal reflux disease with esophagitis, unspecified whether hemorrhage  - omeprazole (PRILOSEC) 40 MG DR capsule  Dispense: 30 capsule; Refill: 0      27 minutes spent on the date of the encounter doing chart review, history and exam, documentation and further activities per the note       BMI:   Estimated body mass index is 24.9 kg/m  as calculated from the following:    Height as of 1/4/22: 1.905 m (6' 3\").    Weight as of this encounter: 90.4 kg (199 lb 3.2 oz).       See Patient Instructions  Follow-up in one month, sooner if needed  Return to clinic if no improvement or symptoms worsen.  Patient verbalized understanding & agreed with plan of care.    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  States management is micromanaging.  He states it is very stressful.  [facility] work is always very busy.  Has a stomachache before work and during.  Has taking nexium x 6 months - nexium OTC.  If he doesn't take it, then he has pain.  Taking nexium 20mg.     GERD/Heartburn  Onset/Duration: x 7 to 8 months  Description:  Epigastric, feels like a \"pop cramp\"  Feels like you to need to burp and sometimes burping does relieve the pain.  Sleeping does help it.    Intensity: moderate to severe  Progression of Symptoms: " worsening  Accompanying Signs & Symptoms:  Does it feel like food gets stuck or trouble swallowing: No  Nausea: None with abdominal pain but has headaches.  Has nausea with headaches.   Vomiting (bloody?): No  Abdominal Pain: YES  Black-Tarry stools: No  Bloody stools: YES but due to hemorrhoid    Has pain with BM     History:  Previous similar episodes: No  Previous ulcers: No but has taken tums in the past for heartburn    Precipitating factors:   Caffeine use: - ice tea - a lot of it    Alcohol use: No  NSAID/Aspirin use: YES - aspirin with a migraine.  Migraines - sleeping neck wrong. Five times a month - two causing throwing up   Tobacco use: No  Worse with fatty foods and spicy foods. Stays away from fried foods.    Alleviating factors:  - nexium does make it better   Therapies tried and outcome:             Lifestyle changes: - weight loss, diet changes             Medications: Nexium    Has constipation due to methadone    Migraines - 5 times a month.    Usually two he feels sick to his stomach and sometimes will vomit. Headache back of head, middle.  Throbbing headache.  No vision changes.  Headaches been present for a few years.  Unsure when the headaches will be.  Is afraid of having headaches.   No arm or leg weakness.   Can feel a tightness in the back of his neck and feels knots back there.    Tylenol does help when it doesn't make his sick.   Must take Tylenol on a full stomach.    STS - methadone.  Trying to get off methadone.   Using medical cannabis.   Using ativan as needed.        Review of Systems   Constitutional, HEENT, cardiovascular, pulmonary, gi and gu systems are negative, except as otherwise noted.      Objective    /86 (BP Location: Right arm, Patient Position: Sitting, Cuff Size: Adult Regular)   Pulse 75   Temp 98.9  F (37.2  C) (Oral)   Resp 17   Wt 90.4 kg (199 lb 3.2 oz)   SpO2 99%   BMI 24.90 kg/m    Body mass index is 24.9 kg/m .  Physical Exam   GENERAL: healthy,  alert and no distress  HENT: oropharynx clear and oral mucous membranes moist  RESP: lungs clear to auscultation - no rales, rhonchi or wheezes  CV: regular rate and rhythm, normal S1 S2, no S3 or S4, no murmur, click or rub, no peripheral edema and peripheral pulses strong  ABDOMEN: soft, nontender, no hepatosplenomegaly, no masses and bowel sounds normal.  No rebound tenderness.  Mild discomfort to palpation of epigastric region     MS: no gross musculoskeletal defects noted, no edema    No results found for any previous visit.       .  ..

## 2022-08-23 DIAGNOSIS — K21.00 GASTROESOPHAGEAL REFLUX DISEASE WITH ESOPHAGITIS, UNSPECIFIED WHETHER HEMORRHAGE: ICD-10-CM

## 2022-08-25 NOTE — TELEPHONE ENCOUNTER
OMEPRAZOLE 40MG CAPSULES      Last Written Prescription Date:  7/26/22  Last Fill Quantity: 30,   # refills: 0  Last Office Visit : 7/26/22  Recommended 1 month follow up  Future Office visit:  None scheduled    Routing refill request to provider for review/approval because:  Limited quantity, no refills last provided

## 2022-08-30 ENCOUNTER — VIRTUAL VISIT (OUTPATIENT)
Dept: FAMILY MEDICINE | Facility: CLINIC | Age: 42
End: 2022-08-30
Payer: COMMERCIAL

## 2022-08-30 DIAGNOSIS — K21.00 GASTROESOPHAGEAL REFLUX DISEASE WITH ESOPHAGITIS, UNSPECIFIED WHETHER HEMORRHAGE: ICD-10-CM

## 2022-08-30 DIAGNOSIS — G44.209 ACUTE NON INTRACTABLE TENSION-TYPE HEADACHE: Primary | ICD-10-CM

## 2022-08-30 PROCEDURE — 99212 OFFICE O/P EST SF 10 MIN: CPT | Mod: GT | Performed by: NURSE PRACTITIONER

## 2022-08-30 RX ORDER — OMEPRAZOLE 40 MG/1
40 CAPSULE, DELAYED RELEASE ORAL DAILY
Qty: 30 CAPSULE | Refills: 0 | Status: SHIPPED | OUTPATIENT
Start: 2022-08-30 | End: 2022-09-27

## 2022-08-30 RX ORDER — OMEPRAZOLE 40 MG/1
40 CAPSULE, DELAYED RELEASE ORAL DAILY
Qty: 30 CAPSULE | Refills: 0 | OUTPATIENT
Start: 2022-08-30

## 2022-08-30 NOTE — PROGRESS NOTES
"Ziggy is a 41 year old who is being evaluated via a billable video visit.      How would you like to obtain your AVS? MyChart  Will anyone else be joining your video visit? No      Assessment & Plan     Gastroesophageal reflux disease with esophagitis, unspecified whether hemorrhage  - omeprazole (PRILOSEC) 40 MG DR capsule  Dispense: 30 capsule; Refill: 0    Acute non intractable tension-type headache    10 minutes spent on the date of the encounter doing chart review, history and exam, documentation and further activities per the note       See Patient Instructions  Continue with current plan of care. Follow-up in one month, sooner if needed  Return to clinic if no improvement or symptoms worsen.  Patient verbalized understanding & agreed with plan of care.    CHOLO Maynard, CNP  [facility] [place]    Marysol Trinh is a 41 year old accompanied by himself, presenting for the following health issues:  Recheck Medication (Med check in)      HPI   Patient was seen on 7/26/2022 for GERD and headaches. He was started on omeprazole 40mg and imitrex and reglan too take as needed. Patient states he recently left his job and is feeling much better.  He states he has not had a migraine since he saw me.  He states at the onset of a couple headaches he tried the medications.  He states he took reglan and imitrex and it worked well.  No side effects from the medications. He states he is is stll having some stomach pain but that it is improved. States yesterday had some pain. States it was located right in the middle of the stomach/same ache, same \"pop cramp\"  States he ran out of the omeprazole yesterday and that why he believes he had the abdominal discomfort. Otherwise, the omeprazole has been working well.he states he needs a refill. 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  He declines any other questions or concerns.    Review of Systems   Constitutional, HEENT, cardiovascular, " pulmonary, gi and gu systems are negative, except as otherwise noted.      Objective             Physical Exam   GENERAL: Healthy, alert and no distress  EYES: Eyes grossly normal to inspection.  No discharge or erythema, or obvious scleral/conjunctival abnormalities.  RESP: No audible wheeze, cough, or visible cyanosis.  No visible retractions or increased work of breathing.    SKIN: Visible skin clear. No significant rash, abnormal pigmentation or lesions.  NEURO: Cranial nerves grossly intact.  Mentation and speech appropriate for age.  PSYCH: Mentation appears normal, affect normal/bright, judgement and insight intact, normal speech and appearance well-groomed.    No results found for any previous visit.         Video-Visit Details    Video Start Time: 3:10pm    Type of service:  Video Visit    Video End Time:3:06pm    Originating Location (pt. Location): Home    Distant Location (provider location):  [facility] [place]     Platform used for Video Visit: DoApp  ..

## 2022-09-27 ENCOUNTER — VIRTUAL VISIT (OUTPATIENT)
Dept: FAMILY MEDICINE | Facility: CLINIC | Age: 42
End: 2022-09-27
Payer: COMMERCIAL

## 2022-09-27 DIAGNOSIS — G44.229 CHRONIC TENSION-TYPE HEADACHE, NOT INTRACTABLE: Primary | ICD-10-CM

## 2022-09-27 DIAGNOSIS — K21.00 GASTROESOPHAGEAL REFLUX DISEASE WITH ESOPHAGITIS, UNSPECIFIED WHETHER HEMORRHAGE: ICD-10-CM

## 2022-09-27 PROCEDURE — 99442 PR PHYSICIAN TELEPHONE EVALUATION 11-20 MIN: CPT | Mod: 95 | Performed by: NURSE PRACTITIONER

## 2022-09-27 RX ORDER — OMEPRAZOLE 40 MG/1
40 CAPSULE, DELAYED RELEASE ORAL DAILY
Qty: 90 CAPSULE | Refills: 0 | Status: SHIPPED | OUTPATIENT
Start: 2022-09-27 | End: 2024-04-18

## 2022-09-27 NOTE — PROGRESS NOTES
Ziggy is a 41 year old who is being evaluated via a billable telephone visit.      What phone number would you like to be contacted at? [phone]   How would you like to obtain your AVS? MyChart    Assessment & Plan     Gastroesophageal reflux disease with esophagitis, unspecified whether hemorrhage  - omeprazole (PRILOSEC) 40 MG DR capsule  Dispense: 90 capsule; Refill: 0  - Continue omeprazole.  Will taper off once he does have symptoms for 8 weeks.      Chronic tension-type headache, not intractable  - Cntinue with PRN imitrix and reglan.      14 minutes spent on the date of the encounter doing chart review, history and exam, documentation and further activities per the note       See Patient Instructions  Follow in 3 months, sooner if needed  Return to clinic if no improvement or symptoms worsen.  Patient verbalized understanding & agreed with plan of care.    CHOLO Maynard CNP [facility]   Ziggy is a 41 year old presenting for the following health issues:  RECHECK (Things have been going well )      HPI   Patient states he is doing well.  Declines any questions or concerns. Patient states he is having headaches - once a week - taking 1,000mg of tylenol and using imitrex as needed.  Hasn't need reglan.  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  GERD is under control with omeprazole, everyone once a while has heartburn symptoms.    Review of Systems   Constitutional, HEENT, cardiovascular, pulmonary, gi and gu systems are negative, except as otherwise noted.      Objective             Physical Exam   healthy, alert and no distress  PSYCH: Alert and oriented times 3; coherent speech, normal   rate and volume, able to articulate logical thoughts, able   to abstract reason, no tangential thoughts, no hallucinations   or delusions  His affect is normal  RESP: No cough, no audible wheezing, able to talk in full  sentences  Remainder of exam unable to be completed due to telephone visits    No diagnostics completed today      Phone call duration: 9 minutes

## 2022-09-28 DIAGNOSIS — K21.00 GASTROESOPHAGEAL REFLUX DISEASE WITH ESOPHAGITIS, UNSPECIFIED WHETHER HEMORRHAGE: ICD-10-CM

## 2022-10-03 RX ORDER — OMEPRAZOLE 40 MG/1
40 CAPSULE, DELAYED RELEASE ORAL DAILY
Qty: 90 CAPSULE | Refills: 1 | OUTPATIENT
Start: 2022-10-03

## 2022-10-03 NOTE — TELEPHONE ENCOUNTER
OMEPRAZOLE 40MG CAPSULES  omeprazole (PRILOSEC) 40 MG DR capsule 90 capsule 0 9/27/2022  No   Sig - Route: Take 1 capsule (40 mg) by mouth daily - Oral   Sent to pharmacy as: Omeprazole 40 MG Oral Capsule Delayed Release (priLOSEC)   Class: E-Prescribe   Order: [id number]   E-Prescribing Status: Receipt confirmed by pharmacy (9/27/2022  3:10 PM CDT)       Printout Tracking    External Result Report     Pharmacy    [id number] - [place], [place] - [address] AT [facility] [address]

## 2023-02-17 ENCOUNTER — VIRTUAL VISIT (OUTPATIENT)
Dept: FAMILY MEDICINE | Facility: CLINIC | Age: 43
End: 2023-02-17
Payer: COMMERCIAL

## 2023-02-17 DIAGNOSIS — K21.00 GASTROESOPHAGEAL REFLUX DISEASE WITH ESOPHAGITIS, UNSPECIFIED WHETHER HEMORRHAGE: Primary | ICD-10-CM

## 2023-02-17 DIAGNOSIS — R61 GENERALIZED HYPERHIDROSIS: ICD-10-CM

## 2023-02-17 DIAGNOSIS — G44.209 ACUTE NON INTRACTABLE TENSION-TYPE HEADACHE: ICD-10-CM

## 2023-02-17 RX ORDER — NICOTINE POLACRILEX 4 MG/1
20 GUM, CHEWING ORAL DAILY
Qty: 90 TABLET | Refills: 3 | Status: SHIPPED | OUTPATIENT
Start: 2023-02-17 | End: 2023-02-17

## 2023-02-17 RX ORDER — SUMATRIPTAN 50 MG/1
50 TABLET, FILM COATED ORAL
Qty: 10 TABLET | Refills: 0 | Status: SHIPPED | OUTPATIENT
Start: 2023-02-17 | End: 2024-04-18

## 2023-02-17 RX ORDER — NICOTINE POLACRILEX 4 MG/1
20 GUM, CHEWING ORAL DAILY
Qty: 90 TABLET | Refills: 1 | Status: SHIPPED | OUTPATIENT
Start: 2023-02-17 | End: 2024-03-06

## 2023-02-17 RX ORDER — OXYBUTYNIN CHLORIDE 10 MG/1
10 TABLET, EXTENDED RELEASE ORAL DAILY
Qty: 90 TABLET | Refills: 1 | Status: SHIPPED | OUTPATIENT
Start: 2023-02-17 | End: 2024-04-18

## 2023-02-17 NOTE — PROGRESS NOTES
"Ziggy is a 42 year old who is being evaluated via a billable telephone visit.      What phone number would you like to be contacted at? [phone]  How would you like to obtain your AVS? Mail a copy  Distant Location (provider location):  On-site        Subjective   Ziggy is a 42 year old, presenting for the following health issues: medication refills.        HPI     42-year-old male with PMH GERD, Tension-headaches, Hyperhidrosis and substance use disorder (IV heroin, sober 4 years) presents for medication refills. Patient previously saw Antoinette Weinstein CNP who has left our clinic.       GERD- Currently controlled with 40mg omeprazole. He reports \"severe abdominal pain\" if he goes 2 days without this medication. He has had GERD symptoms for about a year. He is open to trying a lower dose since his symptoms are controlled when he takes his medication regularly. He has not had an EGD.    Tension headaches- He reports increased stress over past 2 months and reports about 12 headaches/month that resolve with sumatriptan. He reports that he is working with a psychologist for his Anxiety/PTSD.  He denies hx bipolar. He has tried antidepressants in the past but discontinued them as he did not liked the way they made him feel. No other acute concerns/symptoms at time of exam.    Review of Systems   Constitutional, HEENT, cardiovascular, pulmonary, gi and gu systems are negative, except as otherwise noted.          Objective           Vitals:  No vitals were obtained today due to virtual visit.    Physical Exam   healthy, alert and no distress  PSYCH: Alert and oriented times 3; coherent speech, normal   rate and volume, able to articulate logical thoughts, able   to abstract reason, no tangential thoughts, no hallucinations   or delusions  His affect is normal  RESP: No cough, no audible wheezing, able to talk in full sentences  Remainder of exam unable to be completed due to telephone visits    Assessment/Plan    1. Acute " non intractable tension-type headache  Advised Effexor as a preventative, pt declined today as he would like to think about it.  - SUMAtriptan (IMITREX) 50 MG tablet; Take 1 tablet (50 mg) by mouth at onset of headache for migraine May repeat in 2 hours. Max 4 tablets/24 hours.  Dispense: 10 tablet; Refill: 0    2. Gastroesophageal reflux disease with esophagitis, unspecified whether hemorrhage  Pt overdue for BMP. Ordered BMP and CBC. Refer to [facility] to discuss EGD given ab pain when he's off his PPI (concerned he could be masking h.pylori with PPI.) Will try lower dose of omeprazole- if symptoms escalate, he can go back to 40mg.  - Basic metabolic panel; Future  - omeprazole 20 MG tablet; Take 1 tablet (20 mg) by mouth daily Take 30-60 minutes before a meal.  Dispense: 90 tablet; Refill: 1  - CBC with platelets; Future  - Adult GI  Referral - Consult Only; Future    3. Generalized hyperhidrosis  Refill per patient request.  - oxybutynin ER (DITROPAN XL) 10 MG 24 hr tablet; Take 1 tablet (10 mg) by mouth daily  Dispense: 90 tablet; Refill: 1    Follow-up in 3 months for physical, sooner if symptoms persist/worsen.    All questions/concerns addressed. Patient stated understanding/agreement to plan of care.    CHOLO Escoto, CNP  [facility] [employer]      Phone call duration: 10:42 minutes

## 2024-03-01 DIAGNOSIS — K21.00 GASTROESOPHAGEAL REFLUX DISEASE WITH ESOPHAGITIS, UNSPECIFIED WHETHER HEMORRHAGE: ICD-10-CM

## 2024-03-06 RX ORDER — NICOTINE POLACRILEX 4 MG/1
GUM, CHEWING ORAL
Qty: 90 TABLET | Refills: 0 | Status: SHIPPED | OUTPATIENT
Start: 2024-03-06 | End: 2024-06-15

## 2024-03-06 NOTE — TELEPHONE ENCOUNTER
2/17/2023  [facility]     Manolo Hopper APRN CNP Gastroesophageal reflux disease with esophagitis   Kathryn refill sent to the pharmacy - including instructions for patient to call the clinic and schedule an appointment.

## 2024-04-18 ENCOUNTER — OFFICE VISIT (OUTPATIENT)
Dept: FAMILY MEDICINE | Facility: CLINIC | Age: 44
End: 2024-04-18
Payer: COMMERCIAL

## 2024-04-18 VITALS
RESPIRATION RATE: 18 BRPM | BODY MASS INDEX: 23.33 KG/M2 | WEIGHT: 176 LBS | SYSTOLIC BLOOD PRESSURE: 124 MMHG | DIASTOLIC BLOOD PRESSURE: 81 MMHG | TEMPERATURE: 99.2 F | HEART RATE: 90 BPM | HEIGHT: 73 IN | OXYGEN SATURATION: 96 %

## 2024-04-18 DIAGNOSIS — K59.09 CHRONIC CONSTIPATION: ICD-10-CM

## 2024-04-18 DIAGNOSIS — R23.2 HOT FLASH IN MALE: ICD-10-CM

## 2024-04-18 DIAGNOSIS — R10.13 ABDOMINAL PAIN, CHRONIC, EPIGASTRIC: Primary | ICD-10-CM

## 2024-04-18 DIAGNOSIS — G89.29 ABDOMINAL PAIN, CHRONIC, EPIGASTRIC: Primary | ICD-10-CM

## 2024-04-18 RX ORDER — DIAZEPAM 5 MG
5 TABLET ORAL PRN
COMMUNITY
Start: 2024-04-15

## 2024-04-18 RX ORDER — ASPIRIN 81 MG
100 TABLET, DELAYED RELEASE (ENTERIC COATED) ORAL DAILY PRN
Qty: 30 TABLET | Refills: 1 | Status: SHIPPED | OUTPATIENT
Start: 2024-04-18 | End: 2024-08-29

## 2024-04-18 NOTE — PROGRESS NOTES
"Today's Date: Apr 18, 2024     Patient Ziggy Ochoa 1980 presents to the clinic today to address   Chief Complaint   Patient presents with    Gastrointestinal Problem     Has been having stomach issues for about a year, pain in stomach, \"feels like it is going to pop\", stress seems to trigger it, is thinking stomach ulcers, has history of abusing ibuprofen   has taken omeprazole and it seems to help   Referral for a GI specialist             SUBJECTIVE     History of Present Illness:      43-year-old male with past medical history of GERD, tension headaches, hyperhidrosis, and substance use order (currently sober-on methadone) presents to clinic to discuss abdominal pain and hot flashes.    Abdominal pain-patient reports chronic abdominal pain.  The pain will come daily.  When asked about the location of pain, he points to his epigastric region.  Associated symptoms include chronic constipation.  He typically has 1 bowel movement per day, however, he will need to strain.  He attributes constipation to his methadone treatment.  He has never had an EGD, he is interested in completing an EGD considering his symptoms.  Of note, patient reports that he \"abused ibuprofen for years.\"  He denies nausea, vomiting, or diarrhea.  He denies history of pancreatitis.  He denies alcohol use (he reports his last drink was 10 years ago).  He reports many times his pain will relieve after belching.  He is currently on omeprazole 20 mg/day which helps his GERD symptoms.    Hot flashes-patient reports 3 years of intermittent hot flashes \"ever since I started methadone.\"  He denies cough, coughing up blood, swollen lymph nodes, or unexpected weight loss.  He reports intermittent shortness of breath where he feels \"like I really need a deep breath.\"  He is a former smoker.  Denies history of asthma.  He endorses history of anxiety and has a mental health provider who prescribes Valium as needed (Psyfi TMS).  He goes to " specialized treatment services for his methadone.  He has never tried Suboxone.  He is curious about his testosterone levels as he reports chronic low libido.  He endorses daily am erections. No other acute concerns/symptoms at time of exam.      Review of Systems   Constitutional, HEENT, cardiovascular, pulmonary, gi and gu systems are negative, except as otherwise noted.      No Known Allergies     Current Outpatient Medications   Medication Sig Dispense Refill    diazepam (VALIUM) 5 MG tablet Take 5 mg by mouth as needed for anxiety      medical cannabis (Patient's own supply) 1 Dose See Admin Instructions (The purpose of this order is to document that the patient reports taking medical cannabis.  This is not a prescription, and is not used to certify that the patient has a qualifying medical condition.)      Methadone HCl (DOLOPHINE PO) Take 70 mg by mouth daily      omeprazole 20 MG tablet TAKE 1 TABLET(20 MG) BY MOUTH DAILY 30 TO 60 MINUTES BEFORE A MEAL 90 tablet 0     No current facility-administered medications for this visit.         No past medical history on file.     Family History   Problem Relation Age of Onset    Diabetes Type 2  Father     Cancer Maternal Grandfather     Myocardial Infarction Paternal Grandfather         Social History     Tobacco Use    Smoking status: Former    Smokeless tobacco: Never   Substance Use Topics    Alcohol use: Not Currently    Drug use: Yes     Types: Marijuana        History   Sexual Activity    Sexual activity: Not Currently    Partners: Female, Male            6/3/2021     1:05 PM 7/26/2022     1:45 PM   PHQ   PHQ-9 Total Score 5 4   Q9: Thoughts of better off dead/self-harm past 2 weeks Not at all Not at all        Immunization History   Administered Date(s) Administered    COVID-19 MONOVALENT 12+ (Pfizer) 04/28/2021, 05/19/2021, 02/15/2022                 OBJECTIVE     /81 (BP Location: Left arm, Patient Position: Sitting, Cuff Size: Adult Regular)    "Pulse 90   Temp 99.2  F (37.3  C) (Oral)   Resp 18   Ht 1.852 m (6' 0.9\")   Wt 79.8 kg (176 lb)   SpO2 96%   BMI 23.28 kg/m       Labs:  No results found for: \"WBC\", \"HGB\", \"HCT\", \"PLT\", \"CHOL\", \"TRIG\", \"HDL\", \"LDLDIRECT\", \"ALT\", \"AST\", \"NA\", \"CREATININE\", \"BUN\", \"CO2\", \"TSH\", \"PSA\", \"INR\", \"GLUF\", \"HGBA1C\", \"MICROALBUR\"     Physical Exam  Constitutional:       General: He is not in acute distress.     Appearance: He is not ill-appearing or diaphoretic.   Cardiovascular:      Rate and Rhythm: Normal rate and regular rhythm.      Heart sounds: Normal heart sounds. No murmur heard.  Pulmonary:      Effort: Pulmonary effort is normal. No respiratory distress.      Breath sounds: Normal breath sounds. No wheezing or rales.   Abdominal:      General: Bowel sounds are normal.      Palpations: Abdomen is soft.      Tenderness: There is no abdominal tenderness. There is no guarding.   Musculoskeletal:      Cervical back: Neck supple.      Right lower leg: No edema.      Left lower leg: No edema.   Lymphadenopathy:      Cervical: No cervical adenopathy.   Neurological:      General: No focal deficit present.      Mental Status: He is alert.   Psychiatric:         Thought Content: Thought content normal.         Judgment: Judgment normal.               ASSESSMENT/PLAN     1. Abdominal pain, chronic, epigastric    This is a pleasant 43-year-old male with past medical history of GERD, tension headaches, hyperhidrosis, and substance use order (currently sober-on methadone) who presents to discuss chronic epigastric abdominal pain.  Associated symptoms include chronic constipation.  Patient also reports that he is \"abused ibuprofen for years.\"  He denies history of pancreatitis or recent alcohol use.    His vitals are stable, patient is no acute distress.  Physical exam is unremarkable.  Considering history of chronic GERD, past ibuprofen abuse, and daily symptoms, will refer to for EGD and obtain labs noted below.  Will " start Colace to help with chronic constipation. Disposition pending results.      - Adult GI  Referral - Procedure Only; Future  - CBC with platelets differential  - Comprehensive metabolic panel    2. Chronic constipation  Start colace as noted above.  - docusate sodium (COLACE) 100 MG tablet; Take 1 tablet (100 mg) by mouth daily as needed for constipation  Dispense: 30 tablet; Refill: 1  - Adult GI  Referral - Procedure Only; Future    3. Hot flash in male  No diaphoresis noted today. In the absence of cough, coughing up blood, small lymph nodes, or unexpected weight loss-suspect symptoms secondary to methadone (especially considering timing of symptoms).  Advised patient to discuss switching to Suboxone with his addiction medication provider (though suboxone can cause diaphoresis as well).  Will hold off on tuberculosis testing at this time. Low suspicion for low testosterone considering patient has morning erections. Decreased libido also likely 2/2 methadone use/past substance use.    Re: SOB. Lung sounds are currently clear. Monitor at this time.    - CBC with platelets differential  - Comprehensive metabolic panel          Follow-Up:  - Follow up in as needed, or if symptoms worsen or fail to improve.     Options for treatment and follow-up care were reviewed with the patient. Patient engaged in the decision making process and verbalized understanding of the options discussed and agreed with the final plan.  AVS printed and given to patient.    CHOLO Escoto [facility]  [address] [place] [id number]  [phone]        Note: Chart documentation was done in part with Dragon Voice Recognition software.  Although reviewed after completion, some word and grammatical errors may remain. Please contact author for any clarification or concerns.

## 2024-04-23 ENCOUNTER — LAB (OUTPATIENT)
Dept: LAB | Facility: CLINIC | Age: 44
End: 2024-04-23
Payer: COMMERCIAL

## 2024-04-23 DIAGNOSIS — R10.13 ABDOMINAL PAIN, CHRONIC, EPIGASTRIC: ICD-10-CM

## 2024-04-23 DIAGNOSIS — R23.2 HOT FLASH IN MALE: ICD-10-CM

## 2024-04-23 DIAGNOSIS — G89.29 ABDOMINAL PAIN, CHRONIC, EPIGASTRIC: ICD-10-CM

## 2024-04-23 DIAGNOSIS — K59.09 CHRONIC CONSTIPATION: ICD-10-CM

## 2024-04-23 LAB
ALBUMIN SERPL BCG-MCNC: 4.2 G/DL (ref 3.5–5.2)
ALP SERPL-CCNC: 108 U/L (ref 40–150)
ALT SERPL W P-5'-P-CCNC: 15 U/L (ref 0–70)
ANION GAP SERPL CALCULATED.3IONS-SCNC: 10 MMOL/L (ref 7–15)
AST SERPL W P-5'-P-CCNC: 36 U/L (ref 0–45)
BASOPHILS # BLD AUTO: 0 10E3/UL (ref 0–0.2)
BASOPHILS NFR BLD AUTO: 1 %
BILIRUB SERPL-MCNC: 0.4 MG/DL
BUN SERPL-MCNC: 14.5 MG/DL (ref 6–20)
CALCIUM SERPL-MCNC: 8.8 MG/DL (ref 8.6–10)
CHLORIDE SERPL-SCNC: 103 MMOL/L (ref 98–107)
CREAT SERPL-MCNC: 0.85 MG/DL (ref 0.67–1.17)
DEPRECATED HCO3 PLAS-SCNC: 26 MMOL/L (ref 22–29)
EGFRCR SERPLBLD CKD-EPI 2021: >90 ML/MIN/1.73M2
EOSINOPHIL # BLD AUTO: 0.2 10E3/UL (ref 0–0.7)
EOSINOPHIL NFR BLD AUTO: 4 %
ERYTHROCYTE [DISTWIDTH] IN BLOOD BY AUTOMATED COUNT: 13 % (ref 10–15)
GLUCOSE SERPL-MCNC: 111 MG/DL (ref 70–99)
HCT VFR BLD AUTO: 40.9 % (ref 40–53)
HGB BLD-MCNC: 13.4 G/DL (ref 13.3–17.7)
IMM GRANULOCYTES # BLD: 0 10E3/UL
IMM GRANULOCYTES NFR BLD: 0 %
LYMPHOCYTES # BLD AUTO: 1.1 10E3/UL (ref 0.8–5.3)
LYMPHOCYTES NFR BLD AUTO: 24 %
MCH RBC QN AUTO: 29.3 PG (ref 26.5–33)
MCHC RBC AUTO-ENTMCNC: 32.8 G/DL (ref 31.5–36.5)
MCV RBC AUTO: 90 FL (ref 78–100)
MONOCYTES # BLD AUTO: 0.3 10E3/UL (ref 0–1.3)
MONOCYTES NFR BLD AUTO: 7 %
NEUTROPHILS # BLD AUTO: 2.8 10E3/UL (ref 1.6–8.3)
NEUTROPHILS NFR BLD AUTO: 64 %
NRBC # BLD AUTO: 0 10E3/UL
NRBC BLD AUTO-RTO: 0 /100
PLATELET # BLD AUTO: 198 10E3/UL (ref 150–450)
POTASSIUM SERPL-SCNC: 4.2 MMOL/L (ref 3.4–5.3)
PROT SERPL-MCNC: 7.6 G/DL (ref 6.4–8.3)
RBC # BLD AUTO: 4.57 10E6/UL (ref 4.4–5.9)
SODIUM SERPL-SCNC: 139 MMOL/L (ref 135–145)
WBC # BLD AUTO: 4.4 10E3/UL (ref 4–11)

## 2024-04-23 PROCEDURE — 36415 COLL VENOUS BLD VENIPUNCTURE: CPT | Performed by: PATHOLOGY

## 2024-04-23 PROCEDURE — 85025 COMPLETE CBC W/AUTO DIFF WBC: CPT | Performed by: PATHOLOGY

## 2024-04-23 PROCEDURE — 80053 COMPREHEN METABOLIC PANEL: CPT | Performed by: PATHOLOGY

## 2024-04-30 ENCOUNTER — TELEPHONE (OUTPATIENT)
Dept: GASTROENTEROLOGY | Facility: CLINIC | Age: 44
End: 2024-04-30
Payer: COMMERCIAL

## 2024-04-30 NOTE — TELEPHONE ENCOUNTER
"Endoscopy Scheduling Screen    Have you had a positive Covid test in the last 14 days?  No    What is your communication preference for Instructions and/or Bowel Prep?   MyChart    What insurance is in the chart?  Other:  [facility]    Ordering/Referring Provider: Manolo Hopper APRN CNP   (If ordering provider performs procedure, schedule with ordering provider unless otherwise instructed. )    BMI: Estimated body mass index is 23.28 kg/m  as calculated from the following:    Height as of 4/18/24: 1.852 m (6' 0.9\").    Weight as of 4/18/24: 79.8 kg (176 lb).     Sedation Ordered  MAC/deep sedation.   BMI<= 45 45 < BMI <= 48 48 < BMI < = 50  BMI > 50   No Restrictions No [facility]  No [facility]  [facility] with exceptions Hospital Only OR Only       Do you have a history of malignant hyperthermia?  No    (Females) Are you currently pregnant?   No     Have you been diagnosed or told you have pulmonary hypertension?   No    Do you have an LVAD?  No    Have you been told you have moderate to severe sleep apnea?  No    Have you been told you have COPD, asthma, or any other lung disease?  No    Do you have any heart conditions?  No     Have you ever had or are you waiting for an organ transplant?  No. Continue scheduling, no site restrictions.    Have you had a stroke or transient ischemic attack (TIA aka \"mini stroke\" in the last 6 months?   No    Have you been diagnosed with or been told you have cirrhosis of the liver?   No    Are you currently on dialysis?   No    Do you need assistance transferring?   No    BMI: Estimated body mass index is 23.28 kg/m  as calculated from the following:    Height as of 4/18/24: 1.852 m (6' 0.9\").    Weight as of 4/18/24: 79.8 kg (176 lb).     Is patients BMI > 40 and scheduling location [facility]?  No    Do you take an injectable medication for weight loss or diabetes (excluding insulin)?  No    Do you take the medication Naltrexone?  No    Do you take blood thinners?  No       Prep   Are you " currently on dialysis or do you have chronic kidney disease?  No    Do you have a diagnosis of diabetes?  No    Do you have a diagnosis of cystic fibrosis (CF)?  No    On a regular basis do you go 3 -5 days between bowel movements?  Yes (Extended Prep)    BMI > 40?  No    Preferred Pharmacy:    [id number] - [place], [place] - [address]  [address] [address]  Phone: [phone] Fax: [phone]      Final Scheduling Details     Procedure scheduled  Upper endoscopy (EGD)    Surgeon:  Dorene     Date of procedure:  08/29/2024     Pre-OP / PAC:   No - Not required for this site.    Location  [facility] - Patient preference.    Sedation   MAC/Deep Sedation - Per order.      Patient Reminders:   You will receive a call from a Nurse to review instructions and health history.  This assessment must be completed prior to your procedure.  Failure to complete the Nurse assessment may result in the procedure being cancelled.      On the day of your procedure, please designate an adult(s) who can drive you home stay with you for the next 24 hours. The medicines used in the exam will make you sleepy. You will not be able to drive.      You cannot take public transportation, ride share services, or non-medical taxi service without a responsible caregiver.  Medical transport services are allowed with the requirement that a responsible caregiver will receive you at your destination.  We require that drivers and caregivers are confirmed prior to your procedure.

## 2024-05-05 ENCOUNTER — HEALTH MAINTENANCE LETTER (OUTPATIENT)
Age: 44
End: 2024-05-05

## 2024-06-10 DIAGNOSIS — K21.00 GASTROESOPHAGEAL REFLUX DISEASE WITH ESOPHAGITIS, UNSPECIFIED WHETHER HEMORRHAGE: ICD-10-CM

## 2024-06-15 RX ORDER — NICOTINE POLACRILEX 4 MG/1
GUM, CHEWING ORAL
Qty: 84 TABLET | Refills: 1 | Status: SHIPPED | OUTPATIENT
Start: 2024-06-15

## 2024-06-15 NOTE — TELEPHONE ENCOUNTER
LVD:  4/18/2024  [facility]     Manolo Hopper, APRN CNP     Refilled per protocol.

## 2024-08-21 ENCOUNTER — TELEPHONE (OUTPATIENT)
Dept: GASTROENTEROLOGY | Facility: CLINIC | Age: 44
End: 2024-08-21
Payer: COMMERCIAL

## 2024-08-21 NOTE — TELEPHONE ENCOUNTER
Pre visit planning completed.      Procedure details:    Patient scheduled for Upper endoscopy (EGD) on 8/29/24.     Arrival time: 1000. Procedure time 1100    Facility location: [facility]; [address] [place] [address], 5[address] Floor, [place] [address]. Check in location: 5th Floor.    Sedation type: MAC    Pre op exam needed? No.    Indication for procedure:   Abdominal pain, chronic, epigastric [R10.13, G89.29]  - Primary      Chronic constipation            Chart review:     Electronic implanted devices? No    Recent diagnosis of diverticulitis within the last 6 weeks? No      Medication review:    Diabetic? No    Anticoagulants? No    Weight loss medication/injectable? No.    NSAIDS? No    Other medication HOLDING recommendations:  Cannabis/Marijuana: Stop night before procedure.      Prep for procedure:     Prep instructions sent via [employer]         Jeny Cabrera RN  Endoscopy Procedure Pre Assessment RN  [phone] option 4

## 2024-08-21 NOTE — TELEPHONE ENCOUNTER
Pre assessment completed for upcoming procedure.      Procedure details:    Patient scheduled for Upper endoscopy (EGD) on 8/29/24.     Arrival time: 1000. Procedure time 1100     Facility location: [facility]; [address] [place] [address], 5[address] Floor, [place] [address]. Check in location: 5th Floor.     Sedation type: MAC     Pre op exam needed? No.    Designated  policy reviewed. Instructed to have someone stay 24 hours post procedure.       Medication review:    Other medication HOLDING recommendations:  Cannabis/Marijuana: Stop night before procedure.      Prep for procedure:       Reviewed procedure prep instructions.     Patient verbalized understanding and had no questions or concerns at this time.        Jeny Cabrera RN  Endoscopy Procedure Pre Assessment   [phone] option 4

## 2024-08-29 ENCOUNTER — HOSPITAL ENCOUNTER (OUTPATIENT)
Facility: AMBULATORY SURGERY CENTER | Age: 44
Discharge: HOME | End: 2024-08-29
Attending: STUDENT IN AN ORGANIZED HEALTH CARE EDUCATION/TRAINING PROGRAM
Payer: COMMERCIAL

## 2024-08-29 ENCOUNTER — ANESTHESIA EVENT (OUTPATIENT)
Dept: SURGERY | Facility: AMBULATORY SURGERY CENTER | Age: 44
End: 2024-08-29
Payer: COMMERCIAL

## 2024-08-29 ENCOUNTER — ANESTHESIA (OUTPATIENT)
Dept: SURGERY | Facility: AMBULATORY SURGERY CENTER | Age: 44
End: 2024-08-29
Payer: COMMERCIAL

## 2024-08-29 VITALS — HEART RATE: 55 BPM

## 2024-08-29 VITALS
TEMPERATURE: 96.8 F | WEIGHT: 175 LBS | DIASTOLIC BLOOD PRESSURE: 77 MMHG | BODY MASS INDEX: 23.7 KG/M2 | HEART RATE: 61 BPM | RESPIRATION RATE: 16 BRPM | SYSTOLIC BLOOD PRESSURE: 136 MMHG | OXYGEN SATURATION: 95 % | HEIGHT: 72 IN

## 2024-08-29 LAB — UPPER GI ENDOSCOPY: NORMAL

## 2024-08-29 PROCEDURE — 88305 TISSUE EXAM BY PATHOLOGIST: CPT | Mod: 26 | Performed by: PATHOLOGY

## 2024-08-29 PROCEDURE — 88342 IMHCHEM/IMCYTCHM 1ST ANTB: CPT | Mod: 26 | Performed by: PATHOLOGY

## 2024-08-29 PROCEDURE — 43239 EGD BIOPSY SINGLE/MULTIPLE: CPT | Performed by: ANESTHESIOLOGY

## 2024-08-29 PROCEDURE — 43239 EGD BIOPSY SINGLE/MULTIPLE: CPT | Performed by: NURSE ANESTHETIST, CERTIFIED REGISTERED

## 2024-08-29 PROCEDURE — 88342 IMHCHEM/IMCYTCHM 1ST ANTB: CPT | Mod: TC | Performed by: STUDENT IN AN ORGANIZED HEALTH CARE EDUCATION/TRAINING PROGRAM

## 2024-08-29 PROCEDURE — 43239 EGD BIOPSY SINGLE/MULTIPLE: CPT | Performed by: STUDENT IN AN ORGANIZED HEALTH CARE EDUCATION/TRAINING PROGRAM

## 2024-08-29 RX ORDER — NALOXONE HYDROCHLORIDE 0.4 MG/ML
0.4 INJECTION, SOLUTION INTRAMUSCULAR; INTRAVENOUS; SUBCUTANEOUS
Status: DISCONTINUED | OUTPATIENT
Start: 2024-08-29 | End: 2024-08-30 | Stop reason: HOSPADM

## 2024-08-29 RX ORDER — LIDOCAINE 40 MG/G
CREAM TOPICAL
Status: DISCONTINUED | OUTPATIENT
Start: 2024-08-29 | End: 2024-08-29 | Stop reason: HOSPADM

## 2024-08-29 RX ORDER — NALOXONE HYDROCHLORIDE 0.4 MG/ML
0.2 INJECTION, SOLUTION INTRAMUSCULAR; INTRAVENOUS; SUBCUTANEOUS
Status: DISCONTINUED | OUTPATIENT
Start: 2024-08-29 | End: 2024-08-30 | Stop reason: HOSPADM

## 2024-08-29 RX ORDER — LIDOCAINE HYDROCHLORIDE 20 MG/ML
INJECTION, SOLUTION INFILTRATION; PERINEURAL PRN
Status: DISCONTINUED | OUTPATIENT
Start: 2024-08-29 | End: 2024-08-29

## 2024-08-29 RX ORDER — GLYCOPYRROLATE 0.2 MG/ML
INJECTION, SOLUTION INTRAMUSCULAR; INTRAVENOUS PRN
Status: DISCONTINUED | OUTPATIENT
Start: 2024-08-29 | End: 2024-08-29

## 2024-08-29 RX ORDER — FLUMAZENIL 0.1 MG/ML
0.2 INJECTION, SOLUTION INTRAVENOUS
Status: ACTIVE | OUTPATIENT
Start: 2024-08-29 | End: 2024-08-29

## 2024-08-29 RX ORDER — ONDANSETRON 2 MG/ML
4 INJECTION INTRAMUSCULAR; INTRAVENOUS
Status: DISCONTINUED | OUTPATIENT
Start: 2024-08-29 | End: 2024-08-29 | Stop reason: HOSPADM

## 2024-08-29 RX ORDER — ONDANSETRON 4 MG/1
4 TABLET, ORALLY DISINTEGRATING ORAL EVERY 6 HOURS PRN
Status: DISCONTINUED | OUTPATIENT
Start: 2024-08-29 | End: 2024-08-30 | Stop reason: HOSPADM

## 2024-08-29 RX ORDER — SODIUM CHLORIDE, SODIUM LACTATE, POTASSIUM CHLORIDE, CALCIUM CHLORIDE 600; 310; 30; 20 MG/100ML; MG/100ML; MG/100ML; MG/100ML
INJECTION, SOLUTION INTRAVENOUS CONTINUOUS PRN
Status: DISCONTINUED | OUTPATIENT
Start: 2024-08-29 | End: 2024-08-29

## 2024-08-29 RX ORDER — PROCHLORPERAZINE MALEATE 10 MG
10 TABLET ORAL EVERY 6 HOURS PRN
Status: DISCONTINUED | OUTPATIENT
Start: 2024-08-29 | End: 2024-08-30 | Stop reason: HOSPADM

## 2024-08-29 RX ORDER — PROPOFOL 10 MG/ML
INJECTION, EMULSION INTRAVENOUS CONTINUOUS PRN
Status: DISCONTINUED | OUTPATIENT
Start: 2024-08-29 | End: 2024-08-29

## 2024-08-29 RX ORDER — PROPOFOL 10 MG/ML
INJECTION, EMULSION INTRAVENOUS PRN
Status: DISCONTINUED | OUTPATIENT
Start: 2024-08-29 | End: 2024-08-29

## 2024-08-29 RX ORDER — ONDANSETRON 2 MG/ML
4 INJECTION INTRAMUSCULAR; INTRAVENOUS EVERY 6 HOURS PRN
Status: DISCONTINUED | OUTPATIENT
Start: 2024-08-29 | End: 2024-08-30 | Stop reason: HOSPADM

## 2024-08-29 RX ADMIN — PROPOFOL 250 MCG/KG/MIN: 10 INJECTION, EMULSION INTRAVENOUS at 10:51

## 2024-08-29 RX ADMIN — SODIUM CHLORIDE, SODIUM LACTATE, POTASSIUM CHLORIDE, CALCIUM CHLORIDE: 600; 310; 30; 20 INJECTION, SOLUTION INTRAVENOUS at 10:37

## 2024-08-29 RX ADMIN — PROPOFOL 40 MG: 10 INJECTION, EMULSION INTRAVENOUS at 11:03

## 2024-08-29 RX ADMIN — PROPOFOL 70 MG: 10 INJECTION, EMULSION INTRAVENOUS at 10:51

## 2024-08-29 RX ADMIN — GLYCOPYRROLATE 0.2 MG: 0.2 INJECTION, SOLUTION INTRAMUSCULAR; INTRAVENOUS at 10:59

## 2024-08-29 RX ADMIN — LIDOCAINE HYDROCHLORIDE 100 MG: 20 INJECTION, SOLUTION INFILTRATION; PERINEURAL at 10:50

## 2024-08-29 NOTE — ANESTHESIA CARE TRANSFER NOTE
Patient: Ziggy Ochoa    Procedure: Procedure(s):  Esophagoscopy, gastroscopy, duodenoscopy (EGD), combined with biopsy       Diagnosis: Chronic constipation [K59.09]  Abdominal pain, chronic, epigastric [R10.13, G89.29]  Diagnosis Additional Information: No value filed.    Anesthesia Type:   MAC     Note:    Oropharynx: spontaneously breathing  Level of Consciousness: drowsy  Oxygen Supplementation: room air    Independent Airway: airway patency satisfactory and stable  Dentition: dentition unchanged  Vital Signs Stable: post-procedure vital signs reviewed and stable  Report to RN Given: handoff report given  Patient transferred to: Phase II    Handoff Report: Identifed the Patient, Identified the Reponsible Provider, Reviewed the pertinent medical history, Discussed the surgical course, Reviewed Intra-OP anesthesia mangement and issues during anesthesia, Set expectations for post-procedure period and Allowed opportunity for questions and acknowledgement of understanding  Vitals:  Vitals Value Taken Time   BP     Temp     Pulse 55 08/29/24 1103   Resp     SpO2         Electronically Signed By: CHOLO Xiao CRNA  August 29, 2024  11:06 AM

## 2024-08-29 NOTE — H&P
Ziggy REBECA Ochoa  [id number]  male  43 year old      Reason for procedure/surgery: EGD for abdominal pain    There is no problem list on file for this patient.      Past Surgical History:  History reviewed. No pertinent surgical history.    Past Medical History: History reviewed. No pertinent past medical history.    Social History:   Social History     Tobacco Use    Smoking status: Former    Smokeless tobacco: Never   Substance Use Topics    Alcohol use: Not Currently       Family History:   Family History   Problem Relation Age of Onset    Diabetes Type 2  Father     Cancer Maternal Grandfather     Myocardial Infarction Paternal Grandfather        Allergies: No Known Allergies    Active Medications:   Current Outpatient Medications   Medication Sig Dispense Refill    diazepam (VALIUM) 5 MG tablet Take 5 mg by mouth as needed for anxiety      medical cannabis (Patient's own supply) 1 Dose See Admin Instructions (The purpose of this order is to document that the patient reports taking medical cannabis.  This is not a prescription, and is not used to certify that the patient has a qualifying medical condition.)      Methadone HCl (DOLOPHINE PO) Take 70 mg by mouth daily      omeprazole 20 MG tablet TAKE 1 TABLET(20 MG) BY MOUTH DAILY 30 TO 60 MINUTES BEFORE A MEAL 84 tablet 1       Systemic Review:   CONSTITUTIONAL: NEGATIVE for fever, chills, change in weight  ENT/MOUTH: NEGATIVE for ear, mouth and throat problems  RESP: NEGATIVE for significant cough or SOB  CV: NEGATIVE for chest pain, palpitations or peripheral edema    Physical Examination:   Vital Signs: BP (!) 120/104 (BP Location: Right arm)   Pulse 88   Temp 97.5  F (36.4  C) (Temporal)   Resp 18   Ht 1.829 m (6')   Wt 79.4 kg (175 lb)   SpO2 99%   BMI 23.73 kg/m    GENERAL: healthy, alert and no distress  NECK: no adenopathy, no asymmetry, masses, or scars  RESP: lungs clear to auscultation - no rales, rhonchi or wheezes  CV: regular rate and rhythm,  normal S1 S2, no S3 or S4, no murmur, click or rub, no peripheral edema and peripheral pulses strong  ABDOMEN: soft, nontender, no hepatosplenomegaly, no masses and bowel sounds normal  MS: no gross musculoskeletal defects noted, no edema      Plan: Appropriate to proceed as scheduled.      Zoraida Catherine MD  8/29/2024    PCP:  Manolo Hopper

## 2024-08-29 NOTE — ANESTHESIA POSTPROCEDURE EVALUATION
Patient: Ziggy Ochoa    Procedure: Procedure(s):  Esophagoscopy, gastroscopy, duodenoscopy (EGD), combined with biopsy       Anesthesia Type:  MAC    Note:  Disposition: Outpatient   Postop Pain Control: Uneventful            Sign Out: Well controlled pain   PONV: No   Neuro/Psych: Uneventful            Sign Out: Acceptable/Baseline neuro status   Airway/Respiratory: Uneventful            Sign Out: Acceptable/Baseline resp. status   CV/Hemodynamics: Uneventful            Sign Out: Acceptable CV status   Other NRE: NONE   DID A NON-ROUTINE EVENT OCCUR? No           Last vitals:  Vitals Value Taken Time   /77 08/29/24 1130   Temp 36  C (96.8  F) 08/29/24 1108   Pulse 61 08/29/24 1130   Resp 16 08/29/24 1130   SpO2 95 % 08/29/24 1130       Electronically Signed By: Kojo Barraza MD  August 29, 2024  12:59 PM

## 2024-08-29 NOTE — ANESTHESIA PREPROCEDURE EVALUATION
"Anesthesia Pre-Procedure Evaluation    Patient: Ziggy Ochoa   MRN: [id number] : 1980        Procedure : Procedure(s):  Esophagoscopy, gastroscopy, duodenoscopy (EGD), combined          History reviewed. No pertinent past medical history.   History reviewed. No pertinent surgical history.   No Known Allergies   Social History     Tobacco Use    Smoking status: Former    Smokeless tobacco: Never   Substance Use Topics    Alcohol use: Not Currently      Wt Readings from Last 1 Encounters:   24 79.4 kg (175 lb)        Anesthesia Evaluation            ROS/MED HX  ENT/Pulmonary:  - neg pulmonary ROS     Neurologic:  - neg neurologic ROS     Cardiovascular:  - neg cardiovascular ROS     METS/Exercise Tolerance:     Hematologic:  - neg hematologic  ROS     Musculoskeletal:  - neg musculoskeletal ROS     GI/Hepatic:  - neg GI/hepatic ROS     Renal/Genitourinary:  - neg Renal ROS     Endo:  - neg endo ROS     Psychiatric/Substance Use:  - neg psychiatric ROS     Infectious Disease:  - neg infectious disease ROS     Malignancy:  - neg malignancy ROS     Other:  - neg other ROS          Physical Exam    Airway  airway exam normal      Mallampati: II   TM distance: > 3 FB   Neck ROM: full   Mouth opening: > 3 cm    Respiratory Devices and Support         Dental       (+) Completely normal teeth      Cardiovascular          Rhythm and rate: regular and normal     Pulmonary   pulmonary exam normal        breath sounds clear to auscultation           OUTSIDE LABS:  CBC:   Lab Results   Component Value Date    WBC 4.4 2024    HGB 13.4 2024    HCT 40.9 2024     2024     BMP:   Lab Results   Component Value Date     2024    POTASSIUM 4.2 2024    CHLORIDE 103 2024    CO2 26 2024    BUN 14.5 2024    CR 0.85 2024     (H) 2024     COAGS: No results found for: \"PTT\", \"INR\", \"FIBR\"  POC: No results found for: \"BGM\", \"HCG\", " "\"HCGS\"  HEPATIC:   Lab Results   Component Value Date    ALBUMIN 4.2 04/23/2024    PROTTOTAL 7.6 04/23/2024    ALT 15 04/23/2024    AST 36 04/23/2024    ALKPHOS 108 04/23/2024    BILITOTAL 0.4 04/23/2024     OTHER:   Lab Results   Component Value Date    LAYLA 8.8 04/23/2024       Anesthesia Plan    ASA Status:  1    NPO Status:  NPO Appropriate    Anesthesia Type: MAC.     - Reason for MAC: immobility needed, straight local not clinically adequate   Induction: Intravenous.   Maintenance: TIVA.        Consents    Anesthesia Plan(s) and associated risks, benefits, and realistic alternatives discussed. Questions answered and patient/representative(s) expressed understanding.     - Discussed:     - Discussed with:  Patient      - Extended Intubation/Ventilatory Support Discussed: No.      - Patient is DNR/DNI Status: No     Use of blood products discussed: No .     Postoperative Care    Pain management: IV analgesics, Oral pain medications, Multi-modal analgesia.   PONV prophylaxis: Ondansetron (or other 5HT-3), Background Propofol Infusion     Comments:               Kojo Barraza MD    I have reviewed the pertinent notes and labs in the chart from the past 30 days and (re)examined the patient.  Any updates or changes from those notes are reflected in this note.                  "

## 2024-08-30 DIAGNOSIS — K29.80 INFLAMMATION PRESENT ON BIOPSY OF DUODENUM: Primary | ICD-10-CM

## 2024-09-03 LAB
PATH REPORT.ADDENDUM SPEC: NORMAL
PATH REPORT.COMMENTS IMP SPEC: NORMAL
PATH REPORT.FINAL DX SPEC: NORMAL
PATH REPORT.GROSS SPEC: NORMAL
PATH REPORT.MICROSCOPIC SPEC OTHER STN: NORMAL
PATH REPORT.RELEVANT HX SPEC: NORMAL
PHOTO IMAGE: NORMAL

## 2024-10-03 ENCOUNTER — OFFICE VISIT (OUTPATIENT)
Dept: FAMILY MEDICINE | Facility: CLINIC | Age: 44
End: 2024-10-03
Payer: COMMERCIAL

## 2024-10-03 VITALS
WEIGHT: 183.5 LBS | OXYGEN SATURATION: 96 % | RESPIRATION RATE: 17 BRPM | TEMPERATURE: 98.6 F | HEART RATE: 85 BPM | SYSTOLIC BLOOD PRESSURE: 117 MMHG | BODY MASS INDEX: 24.32 KG/M2 | HEIGHT: 73 IN | DIASTOLIC BLOOD PRESSURE: 76 MMHG

## 2024-10-03 DIAGNOSIS — R06.02 SHORTNESS OF BREATH: Primary | ICD-10-CM

## 2024-10-03 DIAGNOSIS — R68.89 ACTIVITY INTOLERANCE: ICD-10-CM

## 2024-10-03 NOTE — NURSING NOTE
"ROOM:2  DEMARCO BARRETT    Preferred Name: Ziggy     Social Determinants of Health   SDoH screening reviewed today: Yes     Services Provided? No    43 year old  Chief Complaint   Patient presents with    breathing concerns     Has been about two years, \"thirst for air\" some tightness in his lungs, has been getting worse        Blood pressure 117/76, pulse 85, temperature 98.6  F (37  C), temperature source Oral, resp. rate 17, height 1.854 m (6' 1\"), weight 83.2 kg (183 lb 8 oz), SpO2 96%. Body mass index is 24.21 kg/m .  BP completed using cuff size:        There is no problem list on file for this patient.      Wt Readings from Last 2 Encounters:   10/03/24 83.2 kg (183 lb 8 oz)   08/29/24 79.4 kg (175 lb)     BP Readings from Last 3 Encounters:   10/03/24 117/76   08/29/24 136/77   04/18/24 124/81       No Known Allergies    Current Outpatient Medications   Medication Sig Dispense Refill    diazepam (VALIUM) 5 MG tablet Take 5 mg by mouth as needed for anxiety      medical cannabis (Patient's own supply) 1 Dose See Admin Instructions (The purpose of this order is to document that the patient reports taking medical cannabis.  This is not a prescription, and is not used to certify that the patient has a qualifying medical condition.)      Methadone HCl (DOLOPHINE PO) Take 70 mg by mouth daily      omeprazole 20 MG tablet TAKE 1 TABLET(20 MG) BY MOUTH DAILY 30 TO 60 MINUTES BEFORE A MEAL 84 tablet 1     No current facility-administered medications for this visit.       Social History     Tobacco Use    Smoking status: Former    Smokeless tobacco: Never   Vaping Use    Vaping status: Every Day   Substance Use Topics    Alcohol use: Not Currently    Drug use: Yes     Types: Marijuana       Honoring Choices - Health Care Directive Guide offered to patient at time of visit.    Health Maintenance Due   Topic Date Due    YEARLY PREVENTIVE VISIT  Never done    ADVANCE CARE PLANNING  Never done    HIV SCREENING  " "Never done    HEPATITIS C SCREENING  Never done    HEPATITIS A IMMUNIZATION (1 of 2 - Risk 2-dose series) Never done    HEPATITIS B IMMUNIZATION (1 of 3 - 19+ 3-dose series) Never done    DTAP/TDAP/TD IMMUNIZATION (1 - Tdap) Never done    LIPID  Never done    INFLUENZA VACCINE (1) Never done    COVID-19 Vaccine (4 - 2024-25 season) 09/01/2024       Immunization History   Administered Date(s) Administered    COVID-19 MONOVALENT 12+ (Pfizer) 04/28/2021, 05/19/2021, 02/15/2022       No results found for: \"PAP\"    Recent Labs   Lab Test 04/23/24  1103   ALT 15   CR 0.85   GFRESTIMATED >90   ALBUMIN 4.2   POTASSIUM 4.2           10/3/2024    12:41 PM 9/27/2022     2:28 PM   PHQ-2 ( 1999 Pfizer)   Q1: Little interest or pleasure in doing things 0 0   Q2: Feeling down, depressed or hopeless 2 0   PHQ-2 Score 2 0   Q1: Little interest or pleasure in doing things Not at all    Q2: Feeling down, depressed or hopeless More than half the days    PHQ-2 Score 2            6/3/2021     1:05 PM 7/26/2022     1:45 PM   PHQ-9 SCORE   PHQ-9 Total Score 5 4           6/3/2021     1:05 PM 7/26/2022     1:45 PM   AGSUTINA-7 SCORE   Total Score 10 13            No data to display                Jaz Ontiveros, EMT    October 3, 2024 12:51 PM    "

## 2024-10-16 ENCOUNTER — NURSE TRIAGE (OUTPATIENT)
Dept: INTERNAL MEDICINE | Facility: CLINIC | Age: 44
End: 2024-10-16
Payer: COMMERCIAL

## 2024-10-16 NOTE — TELEPHONE ENCOUNTER
"Pt called the clinic stating that he is having worsening chest pain for the last couple of days.   The chest pain was severe last night, and has lasted through the morning.   Pt agreed to proceed to the ER now per protocol.  He declined an ambulance and stated he lives right next to the hospital.     Reason for Disposition   Chest pain lasting longer than 5 minutes and ANY of the following:         Pain is crushing, pressure-like, or heavy         Over 44 years old          Over 30 years old and one cardiac risk factor (e.g diabetes, high blood pressure, high cholesterol, smoker, or family history of heart disease)         History of heart disease (e.g. angina, heart attack, heart failure, bypass surgery, takes nitroglycerin)    Additional Information   Negative: SEVERE difficulty breathing (e.g., struggling for each breath, speaks in single words)   Negative: Difficult to awaken or acting confused (e.g., disoriented, slurred speech)   Negative: Shock suspected (e.g., cold/pale/clammy skin, too weak to stand, low BP, rapid pulse)   Negative: Passed out (i.e., lost consciousness, collapsed and was not responding)    Answer Assessment - Initial Assessment Questions  1. LOCATION: \"Where does it hurt?\"        Left upper chest   2. RADIATION: \"Does the pain go anywhere else?\" (e.g., into neck, jaw, arms, back)      No  3. ONSET: \"When did the chest pain begin?\" (Minutes, hours or days)       Worse for a couple of days   4. PATTERN: \"Does the pain come and go, or has it been constant since it started?\"  \"Does it get worse with exertion?\"       Comes and goes, has worsened in last couple of days   5. DURATION: \"How long does it last\" (e.g., seconds, minutes, hours)      Minutes   6. SEVERITY: \"How bad is the pain?\"  (e.g., Scale 1-10; mild, moderate, or severe)     - MILD (1-3): doesn't interfere with normal activities      - MODERATE (4-7): interferes with normal activities or awakens from sleep     - SEVERE (8-10): " "excruciating pain, unable to do any normal activities        2/10  7. CARDIAC RISK FACTORS: \"Do you have any history of heart problems or risk factors for heart disease?\" (e.g., angina, prior heart attack; diabetes, high blood pressure, high cholesterol, smoker, or strong family history of heart disease)      No  8. PULMONARY RISK FACTORS: \"Do you have any history of lung disease?\"  (e.g., blood clots in lung, asthma, emphysema, birth control pills)      SOB for 3 years   9. CAUSE: \"What do you think is causing the chest pain?\"      Heart concerns, is on Methadone - was using Meth 5 years ago  10. OTHER SYMPTOMS: \"Do you have any other symptoms?\" (e.g., dizziness, nausea, vomiting, sweating, fever, difficulty breathing, cough)        Body aches  11. PREGNANCY: \"Is there any chance you are pregnant?\" \"When was your last menstrual period?\"        N/A    Protocols used: Chest Pain-A-[place]  Thank you,  Geraldine Guzman RN    "

## 2024-10-17 ENCOUNTER — ANCILLARY PROCEDURE (OUTPATIENT)
Dept: GENERAL RADIOLOGY | Facility: CLINIC | Age: 44
End: 2024-10-17
Attending: NURSE PRACTITIONER
Payer: COMMERCIAL

## 2024-10-17 ENCOUNTER — OFFICE VISIT (OUTPATIENT)
Dept: PULMONOLOGY | Facility: CLINIC | Age: 44
End: 2024-10-17
Payer: COMMERCIAL

## 2024-10-17 DIAGNOSIS — R06.02 SHORTNESS OF BREATH: ICD-10-CM

## 2024-10-17 DIAGNOSIS — R68.89 ACTIVITY INTOLERANCE: ICD-10-CM

## 2024-10-17 LAB
DLCOUNC-%PRED-PRE: 110 %
DLCOUNC-PRE: 36.95 ML/MIN/MMHG
DLCOUNC-PRED: 33.37 ML/MIN/MMHG
ERV-%PRED-PRE: 134 %
ERV-PRE: 2.56 L
ERV-PRED: 1.91 L
EXPTIME-PRE: 6.26 SEC
FEF2575-%PRED-PRE: 96 %
FEF2575-PRE: 3.81 L/SEC
FEF2575-PRED: 3.97 L/SEC
FEFMAX-%PRED-PRE: 100 %
FEFMAX-PRE: 10.89 L/SEC
FEFMAX-PRED: 10.84 L/SEC
FEV1-%PRED-PRE: 106 %
FEV1-PRE: 4.49 L
FEV1FEV6-PRE: 77 %
FEV1FEV6-PRED: 81 %
FEV1FVC-PRE: 76 %
FEV1FVC-PRED: 80 %
FEV1SVC-PRE: 77 %
FEV1SVC-PRED: 80 %
FIFMAX-PRE: 8.98 L/SEC
FRCPLETH-%PRED-PRE: 140 %
FRCPLETH-PRE: 5.17 L
FRCPLETH-PRED: 3.68 L
FVC-%PRED-PRE: 111 %
FVC-PRE: 5.9 L
FVC-PRED: 5.31 L
IC-%PRED-PRE: 86 %
IC-PRE: 3.3 L
IC-PRED: 3.82 L
RVPLETH-%PRED-PRE: 118 %
RVPLETH-PRE: 2.61 L
RVPLETH-PRED: 2.21 L
TLCPLETH-%PRED-PRE: 108 %
TLCPLETH-PRE: 8.46 L
TLCPLETH-PRED: 7.84 L
VA-%PRED-PRE: 98 %
VA-PRE: 7.35 L
VC-%PRED-PRE: 111 %
VC-PRE: 5.86 L
VC-PRED: 5.27 L

## 2024-10-17 PROCEDURE — 94150 VITAL CAPACITY TEST: CPT | Performed by: INTERNAL MEDICINE

## 2024-10-17 PROCEDURE — 94729 DIFFUSING CAPACITY: CPT | Performed by: INTERNAL MEDICINE

## 2024-10-17 PROCEDURE — 94726 PLETHYSMOGRAPHY LUNG VOLUMES: CPT | Performed by: INTERNAL MEDICINE

## 2024-10-17 PROCEDURE — 94375 RESPIRATORY FLOW VOLUME LOOP: CPT | Performed by: INTERNAL MEDICINE

## 2024-10-17 PROCEDURE — 71046 X-RAY EXAM CHEST 2 VIEWS: CPT | Performed by: RADIOLOGY

## 2024-10-17 NOTE — PROGRESS NOTES
Ziggy Ochoa comes into clinic today at the request of Dr Watson , for PFT    Tolerated testing well. No adverse reactions. Left lab in no distress.        GREG RECINOS

## 2024-10-18 ENCOUNTER — OFFICE VISIT (OUTPATIENT)
Dept: CARDIOLOGY | Facility: CLINIC | Age: 44
End: 2024-10-18
Payer: COMMERCIAL

## 2024-10-18 VITALS
WEIGHT: 180.9 LBS | HEART RATE: 79 BPM | DIASTOLIC BLOOD PRESSURE: 84 MMHG | SYSTOLIC BLOOD PRESSURE: 134 MMHG | RESPIRATION RATE: 20 BRPM | OXYGEN SATURATION: 98 % | BODY MASS INDEX: 23.87 KG/M2

## 2024-10-18 DIAGNOSIS — R07.2 PRECORDIAL PAIN: Primary | ICD-10-CM

## 2024-10-18 PROCEDURE — 93000 ELECTROCARDIOGRAM COMPLETE: CPT | Performed by: STUDENT IN AN ORGANIZED HEALTH CARE EDUCATION/TRAINING PROGRAM

## 2024-10-18 PROCEDURE — 99204 OFFICE O/P NEW MOD 45 MIN: CPT | Performed by: INTERNAL MEDICINE

## 2024-10-18 NOTE — LETTER
10/18/2024    Physician No Ref-Primary  No address on file    RE: Ziggy Ochoa       Dear Colleague,     I had the pleasure of seeing Ziggy Ochoa in the [facility].        Thank you, Dr. Powers ref. provider found, for asking [facility] to evaluate Mr. Ziggy Ochoa.      Assessment/Recommendations   Assessment:    Chest pain, nonexertional, unclear etiology  History of polysubstance abuse on chronic methadone program  Chronic dyspnea, no fluid overload  Anxiety disorder, untreated    Plan:  Stress test and echo to rule out structural heart abnormalities.  If no significant abnormalities he should talk to his psychiatrist about improving control of anxiety       History of Present Illness    Mr. Ziggy Ochoa is a 44 year old male who in for cardiac evaluation.  He reports that he has been feeling short of breath for last few years the shortness of breath is present all the time.  He denies PND orthopnea or pedal edema.  He has not had weight gain.  In last few days he has had episode of chest pains.  The pains came on at rest.  There is no pleuritic features.  There is no exertional features.  Both shortness of breath and chest pains get better after he took a dose of Valium.  He has no history of known heart disease.  He does have a history of polysubstance abuse primarily heroin.  He has used methamphetamine but not in the large amounts.  He is on methadone program he has not used street drugs for about 4 years.    ECG: Personally reviewed.  Normal sinus rhythm within normal limits.       Physical Examination Review of Systems   /84 (BP Location: Right arm, Patient Position: Sitting, Cuff Size: Adult Regular)   Pulse 79   Resp 20   Wt 82.1 kg (180 lb 14.4 oz)   SpO2 98%   BMI 23.87 kg/m    Body mass index is 23.87 kg/m .  Wt Readings from Last 3 Encounters:   10/18/24 82.1 kg (180 lb 14.4 oz)   10/03/24 83.2 kg (183 lb 8 oz)   08/29/24 79.4 kg (175 lb)  "    General Appearance:   Alert, cooperative, no distress, appears stated age   Head/ENT: Normocephalic, without obvious abnormality. Membranes moist      EYES:  no scleral icterus, normal conjunctivae   Neck: Supple, symmetrical, trachea midline, no adenopathy, thyroid: not enlarged, symmetric, no carotid bruit or JVD   Chest/Lungs:   Lungs are clear to auscultation, respirations unlabored. No tenderness or deformity    Cardiovascular:   Regular rhythm, S1, S2 normal, no murmur, rub or gallop.   Abdomen:  Soft, non-tender, bowel sounds active all four quadrants,  no masses, no organomegaly   Extremities: no cyanosis or clubbing. No edema   Skin: Skin color, texture, turgor normal, no rashes or lesions.    Psychiatric: Normal affect, calm   Neurologic: Alert and oriented x 3, moving all four extremities.     Enc Vitals  BP: 134/84  Pulse: 79  Resp: 20  SpO2: 98 %  Weight: 82.1 kg (180 lb 14.4 oz) (with shoes on)  Height:  (6' 1.5\" from yesterday)                                          Lab Results    Chemistry/lipid CBC Cardiac Enzymes/BNP/TSH/INR   No results for input(s): \"CHOL\", \"HDL\", \"LDL\", \"TRIG\", \"CHOLHDLRATIO\" in the last 87480 hours.  No results for input(s): \"LDL\" in the last 35627 hours.  Recent Labs   Lab Test 04/23/24  1103      POTASSIUM 4.2   CHLORIDE 103   CO2 26   *   BUN 14.5   CR 0.85   GFRESTIMATED >90   LAYLA 8.8     Recent Labs   Lab Test 04/23/24  1103   CR 0.85     No results for input(s): \"A1C\" in the last 46138 hours.       Recent Labs   Lab Test 04/23/24  1103   WBC 4.4   HGB 13.4   HCT 40.9   MCV 90        Recent Labs   Lab Test 04/23/24  1103   HGB 13.4    No results for input(s): \"TROPONINI\" in the last 23272 hours.  No results for input(s): \"BNP\", \"NTBNPI\", \"NTBNP\" in the last 71202 hours.  No results for input(s): \"TSH\" in the last 62496 hours.  No results for input(s): \"INR\" in the last 46442 hours.     Medical History  Surgical History Family History Social " History   Substance use Past Surgical History:   Procedure Laterality Date     ESOPHAGOSCOPY, GASTROSCOPY, DUODENOSCOPY (EGD), COMBINED N/A 8/29/2024    Procedure: Esophagoscopy, gastroscopy, duodenoscopy (EGD), combined with biopsy;  Surgeon: Zoraida Catherine MD;  Location: [facility] OR     No premature CAD, SCD,cardiomyopathy   Social History     Socioeconomic History     Marital status: Single     Spouse name: Not on file     Number of children: Not on file     Years of education: Not on file     Highest education level: Not on file   Occupational History     Not on file   Tobacco Use     Smoking status: Former     Smokeless tobacco: Never   Vaping Use     Vaping status: Every Day   Substance and Sexual Activity     Alcohol use: Not Currently     Drug use: Yes     Types: Marijuana     Sexual activity: Not Currently     Partners: Female, Male   Other Topics Concern     Not on file   Social History Narrative     Not on file     Social Determinants of Health     Financial Resource Strain: Low Risk  (4/18/2024)    Financial Resource Strain      Within the past 12 months, have you or your family members you live with been unable to get utilities (heat, electricity) when it was really needed?: No   Food Insecurity: High Risk (4/18/2024)    Food Insecurity      Within the past 12 months, did you worry that your food would run out before you got money to buy more?: Yes      Within the past 12 months, did the food you bought just not last and you didn t have money to get more?: Yes   Transportation Needs: High Risk (4/18/2024)    Transportation Needs      Within the past 12 months, has lack of transportation kept you from medical appointments, getting your medicines, non-medical meetings or appointments, work, or from getting things that you need?: Yes   Physical Activity: Not on file   Stress: Stress Concern Present (10/3/2024)    [employer] [place] [employer] - Occupational Stress Questionnaire      Feeling of  Stress : Rather much   Social Connections: Not on file   Interpersonal Safety: Low Risk  (10/3/2024)    Interpersonal Safety      Do you feel physically and emotionally safe where you currently live?: Yes      Within the past 12 months, have you been hit, slapped, kicked or otherwise physically hurt by someone?: No      Within the past 12 months, have you been humiliated or emotionally abused in other ways by your partner or ex-partner?: No   Housing Stability: Low Risk  (4/18/2024)    Housing Stability      Do you have housing? : Yes      Are you worried about losing your housing?: No         Medications  Allergies   Current Outpatient Medications   Medication Sig Dispense Refill     diazepam (VALIUM) 5 MG tablet Take 5 mg by mouth as needed for anxiety       medical cannabis (Patient's own supply) 1 Dose See Admin Instructions (The purpose of this order is to document that the patient reports taking medical cannabis.  This is not a prescription, and is not used to certify that the patient has a qualifying medical condition.)       Methadone HCl (DOLOPHINE PO) Take 65 mg by mouth daily.       omeprazole 20 MG tablet TAKE 1 TABLET(20 MG) BY MOUTH DAILY 30 TO 60 MINUTES BEFORE A MEAL (Patient taking differently: Take 20 mg by mouth daily.) 84 tablet 1      No Known Allergies                     Thank you for allowing me to participate in the care of your patient.      Sincerely,     Adan Rdz MD     [facility]  cc:   No referring provider defined for this encounter.

## 2024-10-18 NOTE — PROGRESS NOTES
Thank you,  No ref. provider found, for asking [facility] to evaluate Mr. Ziggy Ochoa.      Assessment/Recommendations   Assessment:    Chest pain, nonexertional, unclear etiology  History of polysubstance abuse on chronic methadone program  Chronic dyspnea, no fluid overload  Anxiety disorder, untreated    Plan:  Stress test and echo to rule out structural heart abnormalities.  If no significant abnormalities he should talk to his psychiatrist about improving control of anxiety       History of Present Illness    Mr. Ziggy Ochoa is a 44 year old male who in for cardiac evaluation.  He reports that he has been feeling short of breath for last few years the shortness of breath is present all the time.  He denies PND orthopnea or pedal edema.  He has not had weight gain.  In last few days he has had episode of chest pains.  The pains came on at rest.  There is no pleuritic features.  There is no exertional features.  Both shortness of breath and chest pains get better after he took a dose of Valium.  He has no history of known heart disease.  He does have a history of polysubstance abuse primarily heroin.  He has used methamphetamine but not in the large amounts.  He is on methadone program he has not used street drugs for about 4 years.    ECG: Personally reviewed.  Normal sinus rhythm within normal limits.       Physical Examination Review of Systems   /84 (BP Location: Right arm, Patient Position: Sitting, Cuff Size: Adult Regular)   Pulse 79   Resp 20   Wt 82.1 kg (180 lb 14.4 oz)   SpO2 98%   BMI 23.87 kg/m    Body mass index is 23.87 kg/m .  Wt Readings from Last 3 Encounters:   10/18/24 82.1 kg (180 lb 14.4 oz)   10/03/24 83.2 kg (183 lb 8 oz)   08/29/24 79.4 kg (175 lb)     General Appearance:   Alert, cooperative, no distress, appears stated age   Head/ENT: Normocephalic, without obvious abnormality. Membranes moist      EYES:  no scleral icterus, normal  "conjunctivae   Neck: Supple, symmetrical, trachea midline, no adenopathy, thyroid: not enlarged, symmetric, no carotid bruit or JVD   Chest/Lungs:   Lungs are clear to auscultation, respirations unlabored. No tenderness or deformity    Cardiovascular:   Regular rhythm, S1, S2 normal, no murmur, rub or gallop.   Abdomen:  Soft, non-tender, bowel sounds active all four quadrants,  no masses, no organomegaly   Extremities: no cyanosis or clubbing. No edema   Skin: Skin color, texture, turgor normal, no rashes or lesions.    Psychiatric: Normal affect, calm   Neurologic: Alert and oriented x 3, moving all four extremities.     Enc Vitals  BP: 134/84  Pulse: 79  Resp: 20  SpO2: 98 %  Weight: 82.1 kg (180 lb 14.4 oz) (with shoes on)  Height:  (6' 1.5\" from yesterday)                                          Lab Results    Chemistry/lipid CBC Cardiac Enzymes/BNP/TSH/INR   No results for input(s): \"CHOL\", \"HDL\", \"LDL\", \"TRIG\", \"CHOLHDLRATIO\" in the last 86319 hours.  No results for input(s): \"LDL\" in the last 69000 hours.  Recent Labs   Lab Test 04/23/24  1103      POTASSIUM 4.2   CHLORIDE 103   CO2 26   *   BUN 14.5   CR 0.85   GFRESTIMATED >90   LAYLA 8.8     Recent Labs   Lab Test 04/23/24  1103   CR 0.85     No results for input(s): \"A1C\" in the last 96744 hours.       Recent Labs   Lab Test 04/23/24  1103   WBC 4.4   HGB 13.4   HCT 40.9   MCV 90        Recent Labs   Lab Test 04/23/24  1103   HGB 13.4    No results for input(s): \"TROPONINI\" in the last 92283 hours.  No results for input(s): \"BNP\", \"NTBNPI\", \"NTBNP\" in the last 58711 hours.  No results for input(s): \"TSH\" in the last 84442 hours.  No results for input(s): \"INR\" in the last 81889 hours.     Medical History  Surgical History Family History Social History   Substance use Past Surgical History:   Procedure Laterality Date    ESOPHAGOSCOPY, GASTROSCOPY, DUODENOSCOPY (EGD), COMBINED N/A 8/29/2024    Procedure: Esophagoscopy, gastroscopy, " duodenoscopy (EGD), combined with biopsy;  Surgeon: Zoraida Catherine MD;  Location: [facility] OR     No premature CAD, SCD,cardiomyopathy   Social History     Socioeconomic History    Marital status: Single     Spouse name: Not on file    Number of children: Not on file    Years of education: Not on file    Highest education level: Not on file   Occupational History    Not on file   Tobacco Use    Smoking status: Former    Smokeless tobacco: Never   Vaping Use    Vaping status: Every Day   Substance and Sexual Activity    Alcohol use: Not Currently    Drug use: Yes     Types: Marijuana    Sexual activity: Not Currently     Partners: Female, Male   Other Topics Concern    Not on file   Social History Narrative    Not on file     Social Determinants of Health     Financial Resource Strain: Low Risk  (4/18/2024)    Financial Resource Strain     Within the past 12 months, have you or your family members you live with been unable to get utilities (heat, electricity) when it was really needed?: No   Food Insecurity: High Risk (4/18/2024)    Food Insecurity     Within the past 12 months, did you worry that your food would run out before you got money to buy more?: Yes     Within the past 12 months, did the food you bought just not last and you didn t have money to get more?: Yes   Transportation Needs: High Risk (4/18/2024)    Transportation Needs     Within the past 12 months, has lack of transportation kept you from medical appointments, getting your medicines, non-medical meetings or appointments, work, or from getting things that you need?: Yes   Physical Activity: Not on file   Stress: Stress Concern Present (10/3/2024)    [employer] [place] [employer]ational [employer] - Occupational Stress Questionnaire     Feeling of Stress : Rather much   Social Connections: Not on file   Interpersonal Safety: Low Risk  (10/3/2024)    Interpersonal Safety     Do you feel physically and emotionally safe where you currently live?: Yes     Within  the past 12 months, have you been hit, slapped, kicked or otherwise physically hurt by someone?: No     Within the past 12 months, have you been humiliated or emotionally abused in other ways by your partner or ex-partner?: No   Housing Stability: Low Risk  (4/18/2024)    Housing Stability     Do you have housing? : Yes     Are you worried about losing your housing?: No         Medications  Allergies   Current Outpatient Medications   Medication Sig Dispense Refill    diazepam (VALIUM) 5 MG tablet Take 5 mg by mouth as needed for anxiety      medical cannabis (Patient's own supply) 1 Dose See Admin Instructions (The purpose of this order is to document that the patient reports taking medical cannabis.  This is not a prescription, and is not used to certify that the patient has a qualifying medical condition.)      Methadone HCl (DOLOPHINE PO) Take 65 mg by mouth daily.      omeprazole 20 MG tablet TAKE 1 TABLET(20 MG) BY MOUTH DAILY 30 TO 60 MINUTES BEFORE A MEAL (Patient taking differently: Take 20 mg by mouth daily.) 84 tablet 1      No Known Allergies

## 2024-10-19 LAB
ATRIAL RATE - MUSE: 56 BPM
DIASTOLIC BLOOD PRESSURE - MUSE: NORMAL MMHG
INTERPRETATION ECG - MUSE: NORMAL
P AXIS - MUSE: 63 DEGREES
PR INTERVAL - MUSE: 170 MS
QRS DURATION - MUSE: 92 MS
QT - MUSE: 408 MS
QTC - MUSE: 393 MS
R AXIS - MUSE: 25 DEGREES
SYSTOLIC BLOOD PRESSURE - MUSE: NORMAL MMHG
T AXIS - MUSE: 61 DEGREES
VENTRICULAR RATE- MUSE: 56 BPM

## 2024-10-23 ENCOUNTER — VIRTUAL VISIT (OUTPATIENT)
Dept: FAMILY MEDICINE | Facility: CLINIC | Age: 44
End: 2024-10-23
Payer: COMMERCIAL

## 2024-10-23 DIAGNOSIS — U07.0 E-CIGARETTE OR VAPING PRODUCT USE ASSOCIATED LUNG INJURY (EVALI): Primary | ICD-10-CM

## 2024-10-23 PROCEDURE — 99214 OFFICE O/P EST MOD 30 MIN: CPT | Mod: 95 | Performed by: INTERNAL MEDICINE

## 2024-10-23 RX ORDER — PREDNISONE 20 MG/1
TABLET ORAL
Qty: 20 TABLET | Refills: 0 | Status: SHIPPED | OUTPATIENT
Start: 2024-10-23

## 2024-10-23 NOTE — PROGRESS NOTES
"  If patient has telephone visit, have they been educated on video visit as preferred visit method and offered to change to video visit? NOT APPLICABLE        Instructions Relayed to Patient by Virtual Roomer:     Patient is active on [employer]:   Relayed following to patient: \"It looks like you are active on [employer], are you able to join the visit this way? If not, do you need us to send you a link now or would you like your provider to send a link via text or email when they are ready to initiate the visit?\"      Patient Confirmed they will join visit via: [employer] [place]  Reminded patient to ensure they were logged on to virtual visit by arrival time listed.   Asked if patient has flexibility to initiate visit sooner than arrival time: patient is unable to initiate visit earlier than arrival time     If pediatric virtual visit, ensured pediatric patient along with parent/guardian will be present for video visit.     Patient offered the website [email or web address] and/or phone number to [employer] Help line: [phone]      Ziggy is a 44 year old who is being evaluated via a billable video visit.    How would you like to obtain your AVS? [email or web address]  If the video visit is dropped, the invitation should be resent by: Text to cell phone: [phone]  Will anyone else be joining your video visit? 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)  He has been smoking e-cigarettes for the last 2 years  He uses THC oil in the cigarettes  Previously  he used to just smoke marijuana  For the last 2 years he noticed that he has been having some tightness in the chest  Tightness is present all throughout the day  He describes it as a pressure  He also feels like \"air hunger\"  He has seen multiple providers  They have ordered PFTs which were normal  He also had EKG which was unremarkable except for sinus bradycardia  He is scheduled to get stress test and echocardiogram  He also " "had a chest x-ray which was unremarkable  However he stopped vaping around 2 days ago and already he sees the difference  He no longer has this \"chest tightness\" and \"air hunger\"  He denies any cough   Saturations have been normal  I suspect he has got EVALI  This could be a mild form since he does not have any fever or increasing shortness of breath and his saturations are normal  We will get a CT chest without contrast  I will also give him a short course of steroids  I advised him to stop  vaping completely  Depending upon the results of the CT chest might need pulmonary referral    - predniSONE (DELTASONE) 20 MG tablet; Take 3 tabs by mouth daily x 3 days, then 2 tabs daily x 3 days, then 1 tab daily x 3 days, then 1/2 tab daily x 3 days.  - CT Chest w/o Contrast; Future                Subjective   Ziggy is a 44 year old, presenting for the following health issues:  No chief complaint on file.        10/23/2024     7:39 AM   Additional Questions   Roomed by Tshia   Accompanied by self       Video Start Time:  930 AM    History of Present Illness       Reason for visit:  Respiratory infection        Concern - Respiratory issues from vaping  Onset: 2 years ago  Description: Patient states is having concerning respiratory issues think it is linked from vaping. Patient has not vaped for 24 hours since.  Intensity: moderate  Progression of Symptoms:  same  Accompanying Signs & Symptoms: None  Previous history of similar problem: none  Precipitating factors:        Worsened by: vaping  Alleviating factors:        Improved by: none  Therapies tried and outcome: None        Review of Systems  Constitutional, HEENT, cardiovascular, pulmonary, gi and gu systems are negative, except as otherwise noted.      Objective           Vitals:  No vitals were obtained today due to virtual visit.    Physical Exam   GENERAL: alert and no distress  EYES: Eyes grossly normal to inspection.  No discharge or erythema, or obvious " scleral/conjunctival abnormalities.  RESP: No audible wheeze, cough, or visible cyanosis.    SKIN: Visible skin clear. No significant rash, abnormal pigmentation or lesions.  NEURO: Cranial nerves grossly intact.  Mentation and speech appropriate for age.  PSYCH: Appropriate affect, tone, and pace of words          Video-Visit Details    Type of service:  Video Visit   Video End Time: 945 AM  Originating Location (pt. Location): Home    Distant Location (provider location):  Off-site  Platform used for Video Visit: Kia  Signed Electronically by: Quique Winters MD

## 2024-10-23 NOTE — PROGRESS NOTES
"Ziggy Ochoa is a 44 year old male who presents today with concerns related to shortness of breath.  He notes this particularly when he is going up stairs, he describes as \"thirsty for air.\"  He has had this off and on for 2 years.  He is able to run up to 3 miles without difficulty, he is concerned that he may just be deconditioned.  He denies fevers, no chest pain.  He quit smoking cigarettes 4 years ago, he vapes marijuana, he takes 1-2 \"hits\" every few hours each day.  He has been on methadone for 4 years, he gets hot flashes but otherwise feels like he does well.      He is concerned about celiac disease, he has a family member diagnosed with it, he has been gluten free for 40 days and feels good.    Review Of Systems   ROS: 10 point ROS neg other than the symptoms noted above in the HPI.      History reviewed. No pertinent past medical history.  Past Surgical History:   Procedure Laterality Date    ESOPHAGOSCOPY, GASTROSCOPY, DUODENOSCOPY (EGD), COMBINED N/A 8/29/2024    Procedure: Esophagoscopy, gastroscopy, duodenoscopy (EGD), combined with biopsy;  Surgeon: Zoraida Catherine MD;  [place] [facility] [place]     Social History     Socioeconomic History    Marital status: Single     Spouse name: Not on file    Number of children: Not on file    Years of education: Not on file    Highest education level: Not on file   Occupational History    Not on file   Tobacco Use    Smoking status: Former    Smokeless tobacco: Never   Vaping Use    Vaping status: Every Day   Substance and Sexual Activity    Alcohol use: Not Currently    Drug use: Yes     Types: Marijuana    Sexual activity: Not Currently     Partners: Female, Male   Other Topics Concern    Not on file   Social History Narrative    Not on file     Social Drivers of Health     Financial Resource Strain: Low Risk  (4/18/2024)    Financial Resource Strain     Within the past 12 months, have you or your family members you live with been unable to get utilities (heat, " "electricity) when it was really needed?: No   Food Insecurity: High Risk (4/18/2024)    Food Insecurity     Within the past 12 months, did you worry that your food would run out before you got money to buy more?: Yes     Within the past 12 months, did the food you bought just not last and you didn t have money to get more?: Yes   Transportation Needs: High Risk (4/18/2024)    Transportation Needs     Within the past 12 months, has lack of transportation kept you from medical appointments, getting your medicines, non-medical meetings or appointments, work, or from getting things that you need?: Yes   Physical Activity: Not on file   Stress: Stress Concern Present (10/3/2024)    [employer] [place] [employer] - Occupational Stress Questionnaire     Feeling of Stress : Rather much   Social Connections: Not on file   Interpersonal Safety: Low Risk  (10/3/2024)    Interpersonal Safety     Do you feel physically and emotionally safe where you currently live?: Yes     Within the past 12 months, have you been hit, slapped, kicked or otherwise physically hurt by someone?: No     Within the past 12 months, have you been humiliated or emotionally abused in other ways by your partner or ex-partner?: No   Housing Stability: Low Risk  (4/18/2024)    Housing Stability     Do you have housing? : Yes     Are you worried about losing your housing?: No     Family History   Problem Relation Age of Onset    Diabetes Type 2  Father     Cancer Maternal Grandfather     Myocardial Infarction Paternal Grandfather        /76 (BP Location: Left arm, Patient Position: Sitting, Cuff Size: Adult Regular)   Pulse 85   Temp 98.6  F (37  C) (Oral)   Resp 17   Ht 1.854 m (6' 1\")   Wt 83.2 kg (183 lb 8 oz)   SpO2 96%   BMI 24.21 kg/m      Exam:  Constitutional: healthy, alert, and no distress  Head: Normocephalic. No masses, lesions, tenderness or abnormalities  Neck: Neck supple. No adenopathy. Thyroid symmetric, normal size,, " Carotids without bruits.  ENT: ENT exam normal, no neck nodes or sinus tenderness  Cardiovascular: negative, PMI normal. No lifts, heaves, or thrills. RRR. No murmurs, clicks gallops or rub  Respiratory: negative, Percussion normal. Good diaphragmatic excursion. Lungs clear  Psychiatric: mentation appears normal and affect normal/bright    Assessment/Plan:  1. Shortness of breath (Primary)    - XR CHEST 2 VW; Future  - General PFT Lab (Please always keep checked); Future    2. Activity intolerance    - XR CHEST 2 VW; Future  - General PFT Lab (Please always keep checked); Future    Follow up in 2-3 weeks.    Options for treatment and follow-up care were reviewed with the patient. Patient engaged in the decision making process and verbalized understanding of the options discussed and agreed with the final plan.      History reviewed. No pertinent past medical history.  Past Surgical History:   Procedure Laterality Date    ESOPHAGOSCOPY, GASTROSCOPY, DUODENOSCOPY (EGD), COMBINED N/A 8/29/2024    Procedure: Esophagoscopy, gastroscopy, duodenoscopy (EGD), combined with biopsy;  Surgeon: Zoraida Catherine MD;  Location[place] [facility] [place]     Social History     Socioeconomic History    Marital status: Single     Spouse name: Not on file    Number of children: Not on file    Years of education: Not on file    Highest education level: Not on file   Occupational History    Not on file   Tobacco Use    Smoking status: Former    Smokeless tobacco: Never   Vaping Use    Vaping status: Every Day   Substance and Sexual Activity    Alcohol use: Not Currently    Drug use: Yes     Types: Marijuana    Sexual activity: Not Currently     Partners: Female, Male   Other Topics Concern    Not on file   Social History Narrative    Not on file     Social Drivers of Health     Financial Resource Strain: Low Risk  (4/18/2024)    Financial Resource Strain     Within the past 12 months, have you or your family members you live with been unable to get  "utilities (heat, electricity) when it was really needed?: No   Food Insecurity: High Risk (4/18/2024)    Food Insecurity     Within the past 12 months, did you worry that your food would run out before you got money to buy more?: Yes     Within the past 12 months, did the food you bought just not last and you didn t have money to get more?: Yes   Transportation Needs: High Risk (4/18/2024)    Transportation Needs     Within the past 12 months, has lack of transportation kept you from medical appointments, getting your medicines, non-medical meetings or appointments, work, or from getting things that you need?: Yes   Physical Activity: Not on file   Stress: Stress Concern Present (10/3/2024)    [employer] [place] [employer] - Occupational Stress Questionnaire     Feeling of Stress : Rather much   Social Connections: Not on file   Interpersonal Safety: Low Risk  (10/3/2024)    Interpersonal Safety     Do you feel physically and emotionally safe where you currently live?: Yes     Within the past 12 months, have you been hit, slapped, kicked or otherwise physically hurt by someone?: No     Within the past 12 months, have you been humiliated or emotionally abused in other ways by your partner or ex-partner?: No   Housing Stability: Low Risk  (4/18/2024)    Housing Stability     Do you have housing? : Yes     Are you worried about losing your housing?: No     Family History   Problem Relation Age of Onset    Diabetes Type 2  Father     Cancer Maternal Grandfather     Myocardial Infarction Paternal Grandfather        /76 (BP Location: Left arm, Patient Position: Sitting, Cuff Size: Adult Regular)   Pulse 85   Temp 98.6  F (37  C) (Oral)   Resp 17   Ht 1.854 m (6' 1\")   Wt 83.2 kg (183 lb 8 oz)   SpO2 96%   BMI 24.21 kg/m      Exam:  Constitutional: healthy, alert, and mild distress  Head: Normocephalic. No masses, lesions, tenderness or abnormalities  Neck: Neck supple. No adenopathy. Thyroid " symmetric, normal size,, Carotids without bruits.  ENT: ENT exam normal, no neck nodes or sinus tenderness  Cardiovascular: negative, PMI normal. No lifts, heaves, or thrills. RRR. No murmurs, clicks gallops or rub  Respiratory: negative, Percussion normal. Good diaphragmatic excursion. Lungs clear  Neurologic: Gait normal. Reflexes normal and symmetric. Sensation grossly WNL.  Psychiatric: mentation appears normal and affect normal/bright    Assessment/Plan:  1. Shortness of breath (Primary)    - XR CHEST 2 VW; Future  - General PFT Lab (Please always keep checked); Future    2. Activity intolerance    - XR CHEST 2 VW; Future  - General PFT Lab (Please always keep checked); Future      Options for treatment and follow-up care were reviewed with the patient. Patient engaged in the decision making process and verbalized understanding of the options discussed and agreed with the final plan.

## 2024-11-11 ENCOUNTER — TELEPHONE (OUTPATIENT)
Dept: FAMILY MEDICINE | Facility: CLINIC | Age: 44
End: 2024-11-11

## 2024-11-11 ENCOUNTER — VIRTUAL VISIT (OUTPATIENT)
Dept: FAMILY MEDICINE | Facility: CLINIC | Age: 44
End: 2024-11-11
Payer: COMMERCIAL

## 2024-11-11 ENCOUNTER — MYC MEDICAL ADVICE (OUTPATIENT)
Dept: FAMILY MEDICINE | Facility: CLINIC | Age: 44
End: 2024-11-11

## 2024-11-11 DIAGNOSIS — U07.0 E-CIGARETTE OR VAPING PRODUCT USE ASSOCIATED LUNG INJURY (EVALI): ICD-10-CM

## 2024-11-11 DIAGNOSIS — R06.02 SHORTNESS OF BREATH: ICD-10-CM

## 2024-11-11 DIAGNOSIS — U07.0 E-CIGARETTE OR VAPING PRODUCT USE ASSOCIATED LUNG INJURY (EVALI): Primary | ICD-10-CM

## 2024-11-11 DIAGNOSIS — J45.909: ICD-10-CM

## 2024-11-11 PROCEDURE — 99214 OFFICE O/P EST MOD 30 MIN: CPT | Mod: 95 | Performed by: INTERNAL MEDICINE

## 2024-11-11 RX ORDER — INHALER, ASSIST DEVICES
SPACER (EA) MISCELLANEOUS
Qty: 1 EACH | Refills: 0 | Status: SHIPPED | OUTPATIENT
Start: 2024-11-11

## 2024-11-11 RX ORDER — FLUTICASONE PROPIONATE 110 UG/1
2 AEROSOL, METERED RESPIRATORY (INHALATION) 2 TIMES DAILY
Qty: 12 G | Refills: 0 | Status: SHIPPED | OUTPATIENT
Start: 2024-11-11

## 2024-11-11 NOTE — TELEPHONE ENCOUNTER
Pt calling because he is at pharmacy and they told him that further info is needed for the beclomethasone HFA (QVAR REDIHALER) 80 MCG/ACT inhaler.  States that he needs the medication today.    Called and spoke to pharmacy. States that a PA is needed as it is not covered.        Routing to provider for an alternative medication, as per pt he needs it today.    Delia Amaya, KENDRAN, RN  [facility]

## 2024-11-11 NOTE — TELEPHONE ENCOUNTER
I have sent a different kind of steroid inhaler to see if this is covered  Unfortunately there is nothing much I can do as this is an insurance issue  He can call his insurance company and figure out what steroid inhalers are covered for him   otherwise we will start a PA once we hear back from the pharmacy and the PA process will tell us see what is covered and what is not covered but he can certainly call the insurance company and ask which steroid inhalers are covered as he has got technically asthma type of problem from the e-cigarettes  In any event I sent a different steroid inhaler and let us see if this is covered

## 2024-11-11 NOTE — PROGRESS NOTES
"  If patient has telephone visit, have they been educated on video visit as preferred visit method and offered to change to video visit? NOT APPLICABLE        Instructions Relayed to Patient by Virtual Roomer:     Patient is active on [employer]:   Relayed following to patient: \"It looks like you are active on [employer], are you able to join the visit this way? If not, do you need us to send you a link now or would you like your provider to send a link via text or email when they are ready to initiate the visit?\"      Patient Confirmed they will join visit via: [employer]  Reminded patient to ensure they were logged on to virtual visit by arrival time listed.   Asked if patient has flexibility to initiate visit sooner than arrival time: patient stated yes, documented in appointment notes availability to initiate visit earlier than arrival time     If pediatric virtual visit, ensured pediatric patient along with parent/guardian will be present for video visit.     Patient offered the website [email or web address] and/or phone number to [employer] Help line: [phone]     Ziggy is a 44 year old who is being evaluated via a billable video visit.    How would you like to obtain your AVS? [employer]  If the video visit is dropped, the invitation should be resent by: Text to cell phone: [phone]  Will anyone else be joining your video visit? 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)  He has been smoking e-cigarettes for the last 2 years  He uses THC oil in the cigarettes  Previously  he used to just smoke marijuana  For the last 2 years he noticed that he has been having some tightness in the chest  Tightness is present all throughout the day  He describes it as a pressure  He also feels like \"air hunger\"  He has seen multiple providers  They have ordered PFTs which were normal  He also had EKG which was unremarkable except for sinus bradycardia  He is scheduled " to get stress test and echocardiogram  He also had a chest x-ray which was unremarkable  He is awaiting for a stress test  He has seen with his complaints on the 23rd and lower the CT chest and also a tapering dose of steroids  Do not me shortness of breath got really better and he felt like a new person when he was on steroids  However the steroids have finished he started having symptoms again  He could be having a bronchial hypersensitive reaction from the cigarettes which is persisting even after he has stopped smoking them  He certainly needs a pulmonary eval and maybe a bronchoscopy  Awaiting CT chest  In the meantime I think we can try some steroid inhalers since we cannot use oral steroids on a recurring basis  We discussed about spacer and steroid inhaler  After the prescribed steroid and inhalers to him I was informed that Qvar was not covered by his insurance  I sent the Flovent to see if this will be covered  - beclomethasone HFA (QVAR REDIHALER) 80 MCG/ACT inhaler; Inhale 2 puffs into the lungs 2 times daily.  - Adult Pulmonary Medicine  Referral; Future    Shortness of breath  As above I think his bronchial tree has become hypersensitive from the e-cigarettes  I discussed about slowly tapering the the dose of steroid inhaler gradually down once he starts getting better  I was informed that the beclomethasone was not covered so I sent the Flovent  - beclomethasone HFA (QVAR REDIHALER) 80 MCG/ACT inhaler; Inhale 2 puffs into the lungs 2 times daily.  - spacer (OPTICHAMBER MAGGI) holding chamber; To be uses with Inhaler                Subjective   Ziggy is a 44 year old, presenting for the following health issues:  No chief complaint on file.      11/11/2024     2:23 PM   Additional Questions   Roomed by Luz   Accompanied by self     Video Start Time:  430 pm    Patient stated has quit smoking but has reoccurring inflammation in lungs, was taking prednisone which helped significantly to  improve symptoms, patient would like to follow up.  History of Present Illness       Reason for visit:  Reoccuring symptoms in lungs                Review of Systems  Constitutional, HEENT, cardiovascular, pulmonary, gi and gu systems are negative, except as otherwise noted.      Objective           Vitals:  No vitals were obtained today due to virtual visit.    Physical Exam   GENERAL: alert and no distress  EYES: Eyes grossly normal to inspection.  No discharge or erythema, or obvious scleral/conjunctival abnormalities.  RESP: No audible wheeze, cough, or visible cyanosis.    SKIN: Visible skin clear. No significant rash, abnormal pigmentation or lesions.  NEURO: Cranial nerves grossly intact.  Mentation and speech appropriate for age.  PSYCH: Appropriate affect, tone, and pace of words          Video-Visit Details    Type of service:  Video Visit   Video End Time:500 PM  Originating Location (pt. Location): Home    Distant Location (provider location):  Off-site  Platform used for Video Visit: Kia  Signed Electronically by: Quique Winters MD

## 2024-11-11 NOTE — TELEPHONE ENCOUNTER
Called and spoke to pt.    Said that he will check with pharmacy to see if the new inhaler is covered.      KENDRA StevensonN, RN  [facility]

## 2024-11-12 RX ORDER — BECLOMETHASONE DIPROPIONATE HFA 80 UG/1
2 AEROSOL, METERED RESPIRATORY (INHALATION) 2 TIMES DAILY
Qty: 10.6 G | Refills: 1 | OUTPATIENT
Start: 2024-11-12

## 2024-11-12 NOTE — TELEPHONE ENCOUNTER
Per pharmacy note, medication below needing PA. Please confirm if provider wants to continue with PA or prefers to prescribe alternative?         THEE Shafer  [facility]

## 2024-11-12 NOTE — TELEPHONE ENCOUNTER
Clinic RN: Please investigate patient's chart or contact patient if the information cannot be found because  per pharmacy note, med requires PA. Please confirm with provider if PA is wanted or alternative med will be prescribed.     Solange Gandhi RN

## 2024-11-12 NOTE — TELEPHONE ENCOUNTER
Called and spoke to pt. States that he was able to  the Flovent that was sent in as an alternative to Qvar.    No other questions at this time.    KENDRA StevensonN, RN  [facility]

## 2024-11-12 NOTE — TELEPHONE ENCOUNTER
I have actually changed this to Flovent and sent in new prescriptions today  Please call and see if that is covered or not if not we can start a PA

## 2024-11-19 ENCOUNTER — ANCILLARY PROCEDURE (OUTPATIENT)
Dept: CT IMAGING | Facility: CLINIC | Age: 44
End: 2024-11-19
Attending: INTERNAL MEDICINE
Payer: COMMERCIAL

## 2024-11-19 DIAGNOSIS — U07.0 E-CIGARETTE OR VAPING PRODUCT USE ASSOCIATED LUNG INJURY (EVALI): ICD-10-CM

## 2024-11-19 PROCEDURE — 71250 CT THORAX DX C-: CPT | Performed by: RADIOLOGY

## 2024-12-09 NOTE — TELEPHONE ENCOUNTER
Patient Quality Outreach    Patient is due for the following:   Asthma  -  ACT needed and AAP  Colon Cancer Screening  Physical Preventive Adult Physical      Topic Date Due    Pneumococcal Vaccine (1 of 2 - PCV) Never done    Hepatitis A Vaccine (1 of 2 - Risk 2-dose series) Never done    Hepatitis B Vaccine (1 of 3 - 19+ 3-dose series) Never done    Diptheria Tetanus Pertussis (DTAP/TDAP/TD) Vaccine (1 - Tdap) Never done    Flu Vaccine (1) Never done    COVID-19 Vaccine (4 - 2024-25 season) 09/01/2024       Action(s) Taken:   Schedule a Adult Preventative    Type of outreach:    Sent [employer] message.    Questions for provider review:    None           Bronwyn Reagan

## 2024-12-18 NOTE — CONFIDENTIAL NOTE
RECORDS RECEIVED FROM: internal    DATE RECEIVED: 1.15.25    NOTES STATUS DETAILS   OFFICE NOTE from referring provider internal    Quique Winters MD      OFFICE NOTE from other specialist internal  10.17.24 pulm   10.3.24 mary      MEDICATION LIST internal     IMAGING  (NEED IMAGES AND REPORTS)     CT SCAN internal  11.19.24    CHEST XRAY (CXR) internal  10.17.24    TESTS     PULMONARY FUNCTION TESTING (PFT) internal  10.17.24

## 2025-01-07 DIAGNOSIS — K21.00 GASTROESOPHAGEAL REFLUX DISEASE WITH ESOPHAGITIS, UNSPECIFIED WHETHER HEMORRHAGE: Primary | ICD-10-CM

## 2025-01-15 ENCOUNTER — OFFICE VISIT (OUTPATIENT)
Dept: PULMONOLOGY | Facility: CLINIC | Age: 45
End: 2025-01-15
Attending: INTERNAL MEDICINE
Payer: COMMERCIAL

## 2025-01-15 ENCOUNTER — PRE VISIT (OUTPATIENT)
Dept: PULMONOLOGY | Facility: CLINIC | Age: 45
End: 2025-01-15
Payer: COMMERCIAL

## 2025-01-15 VITALS — OXYGEN SATURATION: 96 % | DIASTOLIC BLOOD PRESSURE: 93 MMHG | HEART RATE: 110 BPM | SYSTOLIC BLOOD PRESSURE: 134 MMHG

## 2025-01-15 DIAGNOSIS — F17.200 VAPING NICOTINE DEPENDENCE, NON-TOBACCO PRODUCT: ICD-10-CM

## 2025-01-15 DIAGNOSIS — Z87.891 HISTORY OF TOBACCO USE: ICD-10-CM

## 2025-01-15 DIAGNOSIS — R06.02 SHORTNESS OF BREATH: Primary | ICD-10-CM

## 2025-01-15 DIAGNOSIS — R07.1 CHEST PAIN ON BREATHING: ICD-10-CM

## 2025-01-15 DIAGNOSIS — J47.9 BRONCHIECTASIS WITHOUT COMPLICATION (H): ICD-10-CM

## 2025-01-15 DIAGNOSIS — T40.715A ADVERSE EFFECT OF CANNABIS, INITIAL ENCOUNTER: ICD-10-CM

## 2025-01-15 PROCEDURE — G0463 HOSPITAL OUTPT CLINIC VISIT: HCPCS | Performed by: INTERNAL MEDICINE

## 2025-01-15 PROCEDURE — 99205 OFFICE O/P NEW HI 60 MIN: CPT | Mod: GC | Performed by: INTERNAL MEDICINE

## 2025-01-15 ASSESSMENT — PAIN SCALES - GENERAL: PAINLEVEL_OUTOF10: MILD PAIN (2)

## 2025-01-15 NOTE — LETTER
"1/15/2025      Ziggy Ochoa  [address]  [facility] [address]      Dear Colleague,    Thank you for referring your patient, Ziggy Ochoa, to the [facility] [place]. Please see a copy of my visit note below.    [facility]   Pulmonary Clinic New Patient     Ziggy Ochoa MRN: [id number]  Date: 01/15/2025    CC: SOB       HPI:  Gerardo Ochoa is a 44M with polysubstance use (former tobacco, heroin & meth currently on methadone, vaping, marijuana) and GERD here for evaluation of SOB.     SOB for the past 2 years. Described as air hunger/urge to take a deep breath. Sx is constant - when he wakes up, at rest, with exertion. No specific triggers. Occurs when he's not perseverating/focused. Then, for the past 3 months (~10/2024) started experience chest discomfort with this. Describes pain as a pressure/tightness. And this is always associated with SOB. Denies coughing or wheezing. When the CP started he stopped vaping - SOB and CP are now 75% better. He continues to smoke cannabis.     Saw cardiology 10/2024. Stress test and TTE ordered but not completed. If testing is negative, atypical CP and SOB likely 2/2 anxiety. EKG sinus bradycardia.   Also evaluated by PCP who diagnosed him with EVALI (on the basis that his sx improved when he stopped vaping). Started high dose prednisone with a rapid taper 10/2024. He overall felt better and \"on top of the world for those two weeks\" but still had lingering breathing issues. Prescribed ICS for possible bronchial hypersensitive reaction and referred to pulmonary. Used ICS for a month and did not help so stopped.     Former tobacco use. Quit 2021. 30 pack years.   Marijuana 1-2 hits every few hours daily. 30 years.   Vaping with THC oil for 2.5 years, quit 10/2024.   Previously used heroin and meth - IVDU, no snorting. Sober for 4 years.   No occupational exposures.     No prior hospitalizations for " pulmonary issues. No recurrent lung infections.     ROS: Heat flash     PMHx/PSHx:  No past medical history on file.  Past Surgical History:   Procedure Laterality Date     ESOPHAGOSCOPY, GASTROSCOPY, DUODENOSCOPY (EGD), COMBINED N/A 8/29/2024    Procedure: Esophagoscopy, gastroscopy, duodenoscopy (EGD), combined with biopsy;  Surgeon: Zoraida Catherine MD;  Location: [facility] [place]     Social Hx:   Former tobacco use. Quit 2021. 30 pack years.   Marijuana 1-2 hits every few hours daily. 30 years.   Vaping with THC oil for 2.5 years, quit 10/2024.   Previously used heroin and meth - IVDU, no snorting. Sober for 4 years.   Denies EtOH.   No occupational exposures.     Outpatient Medications:   Current Outpatient Medications   Medication Sig Dispense Refill     diazepam (VALIUM) 5 MG tablet Take 5 mg by mouth as needed for anxiety       medical cannabis (Patient's own supply) 1 Dose See Admin Instructions (The purpose of this order is to document that the patient reports taking medical cannabis.  This is not a prescription, and is not used to certify that the patient has a qualifying medical condition.)       Methadone HCl (DOLOPHINE PO) Take 65 mg by mouth daily.       omeprazole (PRILOSEC) 20 MG DR capsule TAKE 1 CAPSULE BY MOUTH EVERY DAY 30-60 MINUTES BEFORE A MEAL 84 capsule 1     beclomethasone HFA (QVAR REDIHALER) 80 MCG/ACT inhaler Inhale 2 puffs into the lungs 2 times daily. (Patient not taking: Reported on 1/15/2025) 10.6 g 1     fluticasone (FLOVENT HFA) 110 MCG/ACT inhaler Inhale 2 puffs into the lungs 2 times daily. (Patient not taking: Reported on 1/15/2025) 12 g 0     omeprazole 20 MG tablet TAKE 1 TABLET(20 MG) BY MOUTH DAILY 30 TO 60 MINUTES BEFORE A MEAL (Patient taking differently: Take 20 mg by mouth daily.) 84 tablet 1     predniSONE (DELTASONE) 20 MG tablet Take 3 tabs by mouth daily x 3 days, then 2 tabs daily x 3 days, then 1 tab daily x 3 days, then 1/2 tab daily x 3 days. (Patient not taking: Reported  on 1/15/2025) 20 tablet 0     spacer (OPTICHAMBER MAGGI) holding chamber To be uses with Inhaler 1 each 0     No current facility-administered medications for this visit.       Allergies:   No Known Allergies    Family Hx:   Grandpa  of lung cancer. Was a smoker.     Physical Exam:   BP (!) 134/93 (BP Location: Right arm, Patient Position: Sitting, Cuff Size: Adult Regular)   Pulse 110   SpO2 96%   - Gen: Aox3, NAD   - CV: RRR, no m/r/g  - Lung: CTAB, no respiratory distress   - Ext: No BLE swelling  - Skin: No major rashes or lesions  - Neuro: CN grossly intact     Labs: Eosinophil 200    Images/Studies:   24 CT Chest  Biapical scarring. Mild bronchiectasis          10/17/24 PFT        ASSESSMENT:     Ziggy Ochoa is a 44M with polysubstance use and GERD here for evaluation of SOB and atypical chest pain. CT chest with apical scarring and mild bronchiectasis 2/2 extensive history of tobacco, cannabis, and vaping (currently only smoking cannabis). Has some hyperinflation on CT although PFT is normal. His use of inhaled substances are causing his sx, especially since SOB and CP improved by 75% after he stopped vaping. However, ACS (unstable angina) should also be evaluated - needs to get stress test done. We discussed the need for complete cessation of any inhaled substances to prevent progression of airway & lung damage and he is agreeable to switching to cannabis gummies. His symptoms should improve as well (pending cardiac evaluation).     As for the radiographic bronchiectasis, he is asymptomatic (no productive cough, recurrent pulmonary infection). I do not think he is having any benefit from the ICS and would be okay to stop. If he stops taking inhaled substances, this should not progress. No family history of COPD, bronchiectasis, or alpha 1 antitrypsin.     PLAN:      - Inhaled substance cessation   - Stop ICS   - Schedule stress test and TTE (number provided)     RTC PRN    Patient seen and  discussed with Dr. Jayde Bill MD   Pulmonary/Critical Care Fellow  Pager: [phone]     Total time spent: 60 minutes     Attestation signed by Hodan Donohue MD at 1/15/2025  2:42 PM:  I have discussed Ziggy Ochoa's case with Dr. Moses- Pulmonary & Critical Care Fellow; personally reviewed the patient's available radiographic and laboratory data and examined her.  I agree with the findings, assessment and recommendations as outlined below by Dr. Moses.       Briefly, patient is 44M with previous history of smoking and vaping, notable hyperinflation on CT chest and elevated lung volumes, presenting for exertional chest pain. His chest pain is not explainable by chest imaging or PFTs, and we strongly suspect underlying CAD or deconditioning. He would need to schedule his stress test and 2D Echo as early as possible. Further plan details per Dr. Moses's note.    Total time spent today on patient encounter, documentation, review of chart and care coordination was 60 minutes.     Hodan Donohue M.D.      Again, thank you for allowing me to participate in the care of your patient.        Sincerely,        Sonam Bill MD    Electronically signed

## 2025-01-15 NOTE — PROGRESS NOTES
"[facility]   Pulmonary Clinic New Patient     Ziggy Ochoa MRN: [id number]  Date: 01/15/2025    CC: SOB       HPI:  Gerardo Ochoa is a 44M with polysubstance use (former tobacco, heroin & meth currently on methadone, vaping, marijuana) and GERD here for evaluation of SOB.     SOB for the past 2 years. Described as air hunger/urge to take a deep breath. Sx is constant - when he wakes up, at rest, with exertion. No specific triggers. Occurs when he's not perseverating/focused. Then, for the past 3 months (~10/2024) started experience chest discomfort with this. Describes pain as a pressure/tightness. And this is always associated with SOB. Denies coughing or wheezing. When the CP started he stopped vaping - SOB and CP are now 75% better. He continues to smoke cannabis.     Saw cardiology 10/2024. Stress test and TTE ordered but not completed. If testing is negative, atypical CP and SOB likely 2/2 anxiety. EKG sinus bradycardia.   Also evaluated by PCP who diagnosed him with EVALI (on the basis that his sx improved when he stopped vaping). Started high dose prednisone with a rapid taper 10/2024. He overall felt better and \"on top of the world for those two weeks\" but still had lingering breathing issues. Prescribed ICS for possible bronchial hypersensitive reaction and referred to pulmonary. Used ICS for a month and did not help so stopped.     Former tobacco use. Quit 2021. 30 pack years.   Marijuana 1-2 hits every few hours daily. 30 years.   Vaping with THC oil for 2.5 years, quit 10/2024.   Previously used heroin and meth - IVDU, no snorting. Sober for 4 years.   No occupational exposures.     No prior hospitalizations for pulmonary issues. No recurrent lung infections.     ROS: Heat flash     PMHx/PSHx:  No past medical history on file.  Past Surgical History:   Procedure Laterality Date    ESOPHAGOSCOPY, GASTROSCOPY, DUODENOSCOPY (EGD), COMBINED N/A 8/29/2024    Procedure: Esophagoscopy, " gastroscopy, duodenoscopy (EGD), combined with biopsy;  Surgeon: Zoraida Catherine MD;  Location: [place] OR     Social Hx:   Former tobacco use. Quit . 30 pack years.   Marijuana 1-2 hits every few hours daily. 30 years.   Vaping with THC oil for 2.5 years, quit 10/2024.   Previously used heroin and meth - IVDU, no snorting. Sober for 4 years.   Denies EtOH.   No occupational exposures.     Outpatient Medications:   Current Outpatient Medications   Medication Sig Dispense Refill    diazepam (VALIUM) 5 MG tablet Take 5 mg by mouth as needed for anxiety      medical cannabis (Patient's own supply) 1 Dose See Admin Instructions (The purpose of this order is to document that the patient reports taking medical cannabis.  This is not a prescription, and is not used to certify that the patient has a qualifying medical condition.)      Methadone HCl (DOLOPHINE PO) Take 65 mg by mouth daily.      omeprazole (PRILOSEC) 20 MG DR capsule TAKE 1 CAPSULE BY MOUTH EVERY DAY 30-60 MINUTES BEFORE A MEAL 84 capsule 1    beclomethasone HFA (QVAR REDIHALER) 80 MCG/ACT inhaler Inhale 2 puffs into the lungs 2 times daily. (Patient not taking: Reported on 1/15/2025) 10.6 g 1    fluticasone (FLOVENT HFA) 110 MCG/ACT inhaler Inhale 2 puffs into the lungs 2 times daily. (Patient not taking: Reported on 1/15/2025) 12 g 0    omeprazole 20 MG tablet TAKE 1 TABLET(20 MG) BY MOUTH DAILY 30 TO 60 MINUTES BEFORE A MEAL (Patient taking differently: Take 20 mg by mouth daily.) 84 tablet 1    predniSONE (DELTASONE) 20 MG tablet Take 3 tabs by mouth daily x 3 days, then 2 tabs daily x 3 days, then 1 tab daily x 3 days, then 1/2 tab daily x 3 days. (Patient not taking: Reported on 1/15/2025) 20 tablet 0    spacer (OPTICHAMBER MAGGI) holding chamber To be uses with Inhaler 1 each 0     No current facility-administered medications for this visit.       Allergies:   No Known Allergies    Family Hx:   Grandpa  of lung cancer. Was a smoker.     Physical  Exam:   BP (!) 134/93 (BP Location: Right arm, Patient Position: Sitting, Cuff Size: Adult Regular)   Pulse 110   SpO2 96%   - Gen: Aox3, NAD   - CV: RRR, no m/r/g  - Lung: CTAB, no respiratory distress   - Ext: No BLE swelling  - Skin: No major rashes or lesions  - Neuro: CN grossly intact     Labs: Eosinophil 200    Images/Studies:   11/19/24 CT Chest  Biapical scarring. Mild bronchiectasis          10/17/24 PFT        ASSESSMENT:     Ziggy Ochoa is a 44M with polysubstance use and GERD here for evaluation of SOB and atypical chest pain. CT chest with apical scarring and mild bronchiectasis 2/2 extensive history of tobacco, cannabis, and vaping (currently only smoking cannabis). Has some hyperinflation on CT although PFT is normal. His use of inhaled substances are causing his sx, especially since SOB and CP improved by 75% after he stopped vaping. However, ACS (unstable angina) should also be evaluated - needs to get stress test done. We discussed the need for complete cessation of any inhaled substances to prevent progression of airway & lung damage and he is agreeable to switching to cannabis gummies. His symptoms should improve as well (pending cardiac evaluation).     As for the radiographic bronchiectasis, he is asymptomatic (no productive cough, recurrent pulmonary infection). I do not think he is having any benefit from the ICS and would be okay to stop. If he stops taking inhaled substances, this should not progress. No family history of COPD, bronchiectasis, or alpha 1 antitrypsin.     PLAN:      - Inhaled substance cessation   - Stop ICS   - Schedule stress test and TTE (number provided)     RTC PRN    Patient seen and discussed with Dr. Jayde Bill MD   Pulmonary/Critical Care Fellow  Pager: [phone]     Total time spent: 60 minutes

## 2025-01-15 NOTE — PATIENT INSTRUCTIONS
- Schedule your echocardiogram (heart ultrasound) and stress test for your heart at [facility].   - Stop smoking marijuana. Avoid smoking of any form.   - Stop inhaled steroid.   - No need for further pulmonary follow up unless something changes.

## 2025-01-27 ENCOUNTER — HOSPITAL ENCOUNTER (OUTPATIENT)
Dept: CARDIOLOGY | Facility: CLINIC | Age: 45
Discharge: HOME | End: 2025-01-27
Attending: INTERNAL MEDICINE
Payer: COMMERCIAL

## 2025-01-27 DIAGNOSIS — R07.2 PRECORDIAL PAIN: ICD-10-CM

## 2025-01-27 LAB — LVEF ECHO: NORMAL

## 2025-01-27 PROCEDURE — 93018 CV STRESS TEST I&R ONLY: CPT | Performed by: INTERNAL MEDICINE

## 2025-01-27 PROCEDURE — 93016 CV STRESS TEST SUPVJ ONLY: CPT | Performed by: INTERNAL MEDICINE

## 2025-01-27 PROCEDURE — 93306 TTE W/DOPPLER COMPLETE: CPT | Mod: 26 | Performed by: INTERNAL MEDICINE

## 2025-01-27 PROCEDURE — 93017 CV STRESS TEST TRACING ONLY: CPT

## 2025-01-27 PROCEDURE — 93306 TTE W/DOPPLER COMPLETE: CPT

## 2025-05-17 ENCOUNTER — HEALTH MAINTENANCE LETTER (OUTPATIENT)
Age: 45
End: 2025-05-17

## (undated) DEVICE — ENDO FORCEP BX CAPTURA PRO SPIKE G50696

## (undated) DEVICE — SOL WATER IRRIG 500ML BOTTLE 2F7113

## (undated) DEVICE — SYR 50ML SLIP TIP W/O NDL 309654

## (undated) DEVICE — ENDO BITE BLOCK ADULT OMNI-BLOC

## (undated) DEVICE — SUCTION MANIFOLD NEPTUNE 2 SYS 1 PORT 702-025-000

## (undated) DEVICE — KIT ENDO TURNOVER/PROCEDURE CARRY-ON 101822

## (undated) DEVICE — TUBING SUCTION MEDI-VAC 1/4"X20' N620A

## (undated) DEVICE — GLOVE EXAM NITRILE LG PF LATEX FREE 5064

## (undated) DEVICE — SPECIMEN CONTAINER 3OZ W/FORMALIN 59901

## (undated) DEVICE — GOWN IMPERVIOUS 2XL BLUE